# Patient Record
Sex: MALE | Race: WHITE | NOT HISPANIC OR LATINO | Employment: FULL TIME | URBAN - METROPOLITAN AREA
[De-identification: names, ages, dates, MRNs, and addresses within clinical notes are randomized per-mention and may not be internally consistent; named-entity substitution may affect disease eponyms.]

---

## 2017-06-21 ENCOUNTER — APPOINTMENT (OUTPATIENT)
Dept: GENERAL RADIOLOGY | Facility: CLINIC | Age: 47
DRG: 251 | End: 2017-06-21
Attending: PHYSICIAN ASSISTANT
Payer: COMMERCIAL

## 2017-06-21 ENCOUNTER — HOSPITAL ENCOUNTER (INPATIENT)
Facility: CLINIC | Age: 47
LOS: 1 days | Discharge: HOME | DRG: 251 | End: 2017-06-23
Attending: PHYSICIAN ASSISTANT | Admitting: INTERNAL MEDICINE
Payer: COMMERCIAL

## 2017-06-21 DIAGNOSIS — I25.110 CORONARY ARTERY DISEASE INVOLVING NATIVE CORONARY ARTERY OF NATIVE HEART WITH UNSTABLE ANGINA PECTORIS (H): ICD-10-CM

## 2017-06-21 DIAGNOSIS — R06.02 SHORTNESS OF BREATH: ICD-10-CM

## 2017-06-21 DIAGNOSIS — I20.0 UNSTABLE ANGINA (H): Primary | ICD-10-CM

## 2017-06-21 DIAGNOSIS — I24.9 ACUTE CORONARY SYNDROME (H): ICD-10-CM

## 2017-06-21 DIAGNOSIS — R07.9 ACUTE CHEST PAIN: ICD-10-CM

## 2017-06-21 LAB
ALBUMIN SERPL-MCNC: 4 G/DL (ref 3.4–5)
ALP SERPL-CCNC: 59 U/L (ref 40–150)
ALT SERPL W P-5'-P-CCNC: 24 U/L (ref 0–70)
ANION GAP SERPL CALCULATED.3IONS-SCNC: 5 MMOL/L (ref 3–14)
AST SERPL W P-5'-P-CCNC: 27 U/L (ref 0–45)
BASOPHILS # BLD AUTO: 0 10E9/L (ref 0–0.2)
BASOPHILS NFR BLD AUTO: 0.7 %
BILIRUB SERPL-MCNC: 0.6 MG/DL (ref 0.2–1.3)
BUN SERPL-MCNC: 15 MG/DL (ref 7–30)
CALCIUM SERPL-MCNC: 8.7 MG/DL (ref 8.5–10.1)
CHLORIDE SERPL-SCNC: 107 MMOL/L (ref 94–109)
CO2 SERPL-SCNC: 27 MMOL/L (ref 20–32)
CREAT SERPL-MCNC: 0.95 MG/DL (ref 0.66–1.25)
D DIMER PPP FEU-MCNC: NORMAL UG/ML FEU (ref 0–0.5)
DIFFERENTIAL METHOD BLD: ABNORMAL
EOSINOPHIL # BLD AUTO: 0.1 10E9/L (ref 0–0.7)
EOSINOPHIL NFR BLD AUTO: 2.3 %
ERYTHROCYTE [DISTWIDTH] IN BLOOD BY AUTOMATED COUNT: 14.1 % (ref 10–15)
GFR SERPL CREATININE-BSD FRML MDRD: 85 ML/MIN/1.7M2
GLUCOSE SERPL-MCNC: 92 MG/DL (ref 70–99)
HCT VFR BLD AUTO: 43.5 % (ref 40–53)
HGB BLD-MCNC: 15.4 G/DL (ref 13.3–17.7)
IMM GRANULOCYTES # BLD: 0 10E9/L (ref 0–0.4)
IMM GRANULOCYTES NFR BLD: 0.2 %
LIPASE SERPL-CCNC: 182 U/L (ref 73–393)
LYMPHOCYTES # BLD AUTO: 2.3 10E9/L (ref 0.8–5.3)
LYMPHOCYTES NFR BLD AUTO: 41.3 %
MCH RBC QN AUTO: 31.4 PG (ref 26.5–33)
MCHC RBC AUTO-ENTMCNC: 35.4 G/DL (ref 31.5–36.5)
MCV RBC AUTO: 89 FL (ref 78–100)
MONOCYTES # BLD AUTO: 0.5 10E9/L (ref 0–1.3)
MONOCYTES NFR BLD AUTO: 8.9 %
NEUTROPHILS # BLD AUTO: 2.6 10E9/L (ref 1.6–8.3)
NEUTROPHILS NFR BLD AUTO: 46.6 %
NRBC # BLD AUTO: 0.1 10*3/UL
NRBC BLD AUTO-RTO: 1 /100
PLATELET # BLD AUTO: 209 10E9/L (ref 150–450)
POTASSIUM SERPL-SCNC: 3.8 MMOL/L (ref 3.4–5.3)
PROT SERPL-MCNC: 8 G/DL (ref 6.8–8.8)
RBC # BLD AUTO: 4.91 10E12/L (ref 4.4–5.9)
SODIUM SERPL-SCNC: 139 MMOL/L (ref 133–144)
TROPONIN I SERPL-MCNC: NORMAL UG/L (ref 0–0.04)
WBC # BLD AUTO: 5.6 10E9/L (ref 4–11)

## 2017-06-21 PROCEDURE — 25000132 ZZH RX MED GY IP 250 OP 250 PS 637: Performed by: PHYSICIAN ASSISTANT

## 2017-06-21 PROCEDURE — 85379 FIBRIN DEGRADATION QUANT: CPT | Performed by: PHYSICIAN ASSISTANT

## 2017-06-21 PROCEDURE — 84484 ASSAY OF TROPONIN QUANT: CPT | Performed by: PHYSICIAN ASSISTANT

## 2017-06-21 PROCEDURE — 83690 ASSAY OF LIPASE: CPT | Performed by: PHYSICIAN ASSISTANT

## 2017-06-21 PROCEDURE — 93005 ELECTROCARDIOGRAM TRACING: CPT

## 2017-06-21 PROCEDURE — 85025 COMPLETE CBC W/AUTO DIFF WBC: CPT | Performed by: PHYSICIAN ASSISTANT

## 2017-06-21 PROCEDURE — 80053 COMPREHEN METABOLIC PANEL: CPT | Performed by: PHYSICIAN ASSISTANT

## 2017-06-21 PROCEDURE — 99285 EMERGENCY DEPT VISIT HI MDM: CPT | Mod: 25

## 2017-06-21 PROCEDURE — 71020 XR CHEST 2 VW: CPT

## 2017-06-21 RX ORDER — NITROGLYCERIN 0.4 MG/1
0.4 TABLET SUBLINGUAL EVERY 5 MIN PRN
Status: DISCONTINUED | OUTPATIENT
Start: 2017-06-21 | End: 2017-06-22

## 2017-06-21 RX ORDER — ASPIRIN 81 MG/1
324 TABLET, CHEWABLE ORAL ONCE
Status: COMPLETED | OUTPATIENT
Start: 2017-06-21 | End: 2017-06-21

## 2017-06-21 RX ORDER — LIDOCAINE 40 MG/G
CREAM TOPICAL
Status: DISCONTINUED | OUTPATIENT
Start: 2017-06-21 | End: 2017-06-22

## 2017-06-21 RX ADMIN — ASPIRIN 81 MG 324 MG: 81 TABLET ORAL at 22:01

## 2017-06-21 RX ADMIN — NITROGLYCERIN 0.4 MG: 0.4 TABLET SUBLINGUAL at 22:50

## 2017-06-21 RX ADMIN — NITROGLYCERIN 0.4 MG: 0.4 TABLET SUBLINGUAL at 22:57

## 2017-06-21 ASSESSMENT — ENCOUNTER SYMPTOMS
DIZZINESS: 1
CHILLS: 0
NAUSEA: 0
FEVER: 0
VOMITING: 0
SHORTNESS OF BREATH: 1

## 2017-06-21 NOTE — IP AVS SNAPSHOT
[facility]    [address]    [facility] [address]    Phone:  [phone]                                       After Visit Summary   6/21/2017    Chase Blackwell    MRN: [id number]           After Visit Summary Signature Page     I have received my discharge instructions, and my questions have been answered. I have discussed any challenges I see with this plan with the nurse or doctor.    ..........................................................................................................................................  Patient/Patient Representative Signature      ..........................................................................................................................................  Patient Representative Print Name and Relationship to Patient    ..................................................               ................................................  Date                                            Time    ..........................................................................................................................................  Reviewed by Signature/Title    ...................................................              ..............................................  Date                                                            Time

## 2017-06-22 ENCOUNTER — APPOINTMENT (OUTPATIENT)
Dept: CARDIOLOGY | Facility: CLINIC | Age: 47
DRG: 251 | End: 2017-06-22
Attending: INTERNAL MEDICINE
Payer: COMMERCIAL

## 2017-06-22 PROBLEM — R07.9 CHEST PAIN: Status: ACTIVE | Noted: 2017-06-22

## 2017-06-22 PROBLEM — I20.0 UNSTABLE ANGINA (H): Status: ACTIVE | Noted: 2017-06-22

## 2017-06-22 PROBLEM — I24.9 ACUTE CORONARY SYNDROME (H): Status: ACTIVE | Noted: 2017-06-22

## 2017-06-22 LAB
CHOLEST SERPL-MCNC: 224 MG/DL
HDLC SERPL-MCNC: 35 MG/DL
LDLC SERPL CALC-MCNC: 125 MG/DL
MAGNESIUM SERPL-MCNC: 2.2 MG/DL (ref 1.6–2.3)
NONHDLC SERPL-MCNC: 189 MG/DL
T4 FREE SERPL-MCNC: 0.64 NG/DL (ref 0.76–1.46)
TRIGL SERPL-MCNC: 318 MG/DL
TROPONIN I SERPL-MCNC: NORMAL UG/L (ref 0–0.04)
TROPONIN I SERPL-MCNC: NORMAL UG/L (ref 0–0.04)
TSH SERPL DL<=0.005 MIU/L-ACNC: 37.14 MU/L (ref 0.4–4)

## 2017-06-22 PROCEDURE — 93458 L HRT ARTERY/VENTRICLE ANGIO: CPT | Mod: 26 | Performed by: INTERNAL MEDICINE

## 2017-06-22 PROCEDURE — 93458 L HRT ARTERY/VENTRICLE ANGIO: CPT

## 2017-06-22 PROCEDURE — 02703ZZ DILATION OF CORONARY ARTERY, ONE ARTERY, PERCUTANEOUS APPROACH: ICD-10-PCS | Performed by: INTERNAL MEDICINE

## 2017-06-22 PROCEDURE — 84484 ASSAY OF TROPONIN QUANT: CPT | Performed by: INTERNAL MEDICINE

## 2017-06-22 PROCEDURE — C1769 GUIDE WIRE: HCPCS

## 2017-06-22 PROCEDURE — 25000125 ZZHC RX 250: Performed by: INTERNAL MEDICINE

## 2017-06-22 PROCEDURE — 99153 MOD SED SAME PHYS/QHP EA: CPT

## 2017-06-22 PROCEDURE — 27210759 ZZH DEVICE INFLATION CR6

## 2017-06-22 PROCEDURE — G0378 HOSPITAL OBSERVATION PER HR: HCPCS

## 2017-06-22 PROCEDURE — 99207 ZZC APP CREDIT; MD BILLING SHARED VISIT: CPT | Performed by: INTERNAL MEDICINE

## 2017-06-22 PROCEDURE — 25000132 ZZH RX MED GY IP 250 OP 250 PS 637: Performed by: INTERNAL MEDICINE

## 2017-06-22 PROCEDURE — 27211089 ZZH KIT ACIST INJECTOR CR3

## 2017-06-22 PROCEDURE — B2151ZZ FLUOROSCOPY OF LEFT HEART USING LOW OSMOLAR CONTRAST: ICD-10-PCS | Performed by: INTERNAL MEDICINE

## 2017-06-22 PROCEDURE — C1725 CATH, TRANSLUMIN NON-LASER: HCPCS

## 2017-06-22 PROCEDURE — 99153 MOD SED SAME PHYS/QHP EA: CPT | Performed by: INTERNAL MEDICINE

## 2017-06-22 PROCEDURE — 99207 ZZC CDG-CODE CATEGORY CHANGED: CPT | Performed by: INTERNAL MEDICINE

## 2017-06-22 PROCEDURE — 93325 DOPPLER ECHO COLOR FLOW MAPG: CPT | Mod: 26 | Performed by: INTERNAL MEDICINE

## 2017-06-22 PROCEDURE — 93010 ELECTROCARDIOGRAM REPORT: CPT | Performed by: INTERNAL MEDICINE

## 2017-06-22 PROCEDURE — 93321 DOPPLER ECHO F-UP/LMTD STD: CPT | Mod: 26 | Performed by: INTERNAL MEDICINE

## 2017-06-22 PROCEDURE — 99152 MOD SED SAME PHYS/QHP 5/>YRS: CPT | Performed by: INTERNAL MEDICINE

## 2017-06-22 PROCEDURE — 93016 CV STRESS TEST SUPVJ ONLY: CPT | Performed by: INTERNAL MEDICINE

## 2017-06-22 PROCEDURE — 93005 ELECTROCARDIOGRAM TRACING: CPT

## 2017-06-22 PROCEDURE — C1874 STENT, COATED/COV W/DEL SYS: HCPCS

## 2017-06-22 PROCEDURE — 27210795 ZZH PAD DEFIB QUICK CR4

## 2017-06-22 PROCEDURE — 80061 LIPID PANEL: CPT | Performed by: INTERNAL MEDICINE

## 2017-06-22 PROCEDURE — 27210787 ZZH MANIFOLD CR2

## 2017-06-22 PROCEDURE — 84443 ASSAY THYROID STIM HORMONE: CPT | Performed by: INTERNAL MEDICINE

## 2017-06-22 PROCEDURE — 4A023N7 MEASUREMENT OF CARDIAC SAMPLING AND PRESSURE, LEFT HEART, PERCUTANEOUS APPROACH: ICD-10-PCS | Performed by: INTERNAL MEDICINE

## 2017-06-22 PROCEDURE — 40000235 ZZH STATISTIC TELEMETRY

## 2017-06-22 PROCEDURE — 27210946 ZZH KIT HC TOTES DISP CR8

## 2017-06-22 PROCEDURE — 21000000 ZZH R&B IMCU HEART CARE

## 2017-06-22 PROCEDURE — 83735 ASSAY OF MAGNESIUM: CPT | Performed by: INTERNAL MEDICINE

## 2017-06-22 PROCEDURE — 93350 STRESS TTE ONLY: CPT | Mod: TC

## 2017-06-22 PROCEDURE — 25000128 H RX IP 250 OP 636: Performed by: INTERNAL MEDICINE

## 2017-06-22 PROCEDURE — 99207 ZZC APP CREDIT; MD BILLING SHARED VISIT: CPT | Performed by: PHYSICIAN ASSISTANT

## 2017-06-22 PROCEDURE — 40000806 ZZH STATISTIC OTHER ED EXTENDED CARE

## 2017-06-22 PROCEDURE — 99223 1ST HOSP IP/OBS HIGH 75: CPT | Performed by: INTERNAL MEDICINE

## 2017-06-22 PROCEDURE — B2111ZZ FLUOROSCOPY OF MULTIPLE CORONARY ARTERIES USING LOW OSMOLAR CONTRAST: ICD-10-PCS | Performed by: INTERNAL MEDICINE

## 2017-06-22 PROCEDURE — 99152 MOD SED SAME PHYS/QHP 5/>YRS: CPT

## 2017-06-22 PROCEDURE — 93018 CV STRESS TEST I&R ONLY: CPT | Performed by: INTERNAL MEDICINE

## 2017-06-22 PROCEDURE — 99221 1ST HOSP IP/OBS SF/LOW 40: CPT | Mod: 25 | Performed by: INTERNAL MEDICINE

## 2017-06-22 PROCEDURE — 92928 PRQ TCAT PLMT NTRAC ST 1 LES: CPT | Mod: LD | Performed by: INTERNAL MEDICINE

## 2017-06-22 PROCEDURE — 93350 STRESS TTE ONLY: CPT | Mod: 26 | Performed by: INTERNAL MEDICINE

## 2017-06-22 PROCEDURE — 36415 COLL VENOUS BLD VENIPUNCTURE: CPT | Performed by: INTERNAL MEDICINE

## 2017-06-22 PROCEDURE — 84439 ASSAY OF FREE THYROXINE: CPT | Performed by: INTERNAL MEDICINE

## 2017-06-22 PROCEDURE — C1887 CATHETER, GUIDING: HCPCS

## 2017-06-22 PROCEDURE — 27210998 ZZH ACCESS HEART CATH CR6

## 2017-06-22 PROCEDURE — C9600 PERC DRUG-EL COR STENT SING: HCPCS | Mod: LD

## 2017-06-22 RX ORDER — LORAZEPAM 0.5 MG/1
0.5 TABLET ORAL
Status: DISCONTINUED | OUTPATIENT
Start: 2017-06-22 | End: 2017-06-22

## 2017-06-22 RX ORDER — NALOXONE HYDROCHLORIDE 0.4 MG/ML
.1-.4 INJECTION, SOLUTION INTRAMUSCULAR; INTRAVENOUS; SUBCUTANEOUS
Status: DISCONTINUED | OUTPATIENT
Start: 2017-06-22 | End: 2017-06-22

## 2017-06-22 RX ORDER — NALOXONE HYDROCHLORIDE 0.4 MG/ML
.1-.4 INJECTION, SOLUTION INTRAMUSCULAR; INTRAVENOUS; SUBCUTANEOUS
Status: DISCONTINUED | OUTPATIENT
Start: 2017-06-22 | End: 2017-06-23 | Stop reason: HOSPADM

## 2017-06-22 RX ORDER — PROTAMINE SULFATE 10 MG/ML
25-100 INJECTION, SOLUTION INTRAVENOUS EVERY 5 MIN PRN
Status: DISCONTINUED | OUTPATIENT
Start: 2017-06-22 | End: 2017-06-22 | Stop reason: HOSPADM

## 2017-06-22 RX ORDER — FLUMAZENIL 0.1 MG/ML
0.2 INJECTION, SOLUTION INTRAVENOUS
Status: DISCONTINUED | OUTPATIENT
Start: 2017-06-22 | End: 2017-06-22 | Stop reason: HOSPADM

## 2017-06-22 RX ORDER — IOPAMIDOL 755 MG/ML
160 INJECTION, SOLUTION INTRAVASCULAR ONCE
Status: COMPLETED | OUTPATIENT
Start: 2017-06-22 | End: 2017-06-22

## 2017-06-22 RX ORDER — ACETAMINOPHEN 325 MG/1
325-650 TABLET ORAL EVERY 4 HOURS PRN
Status: DISCONTINUED | OUTPATIENT
Start: 2017-06-22 | End: 2017-06-22

## 2017-06-22 RX ORDER — HEPARIN SODIUM 1000 [USP'U]/ML
1000-10000 INJECTION, SOLUTION INTRAVENOUS; SUBCUTANEOUS EVERY 5 MIN PRN
Status: DISCONTINUED | OUTPATIENT
Start: 2017-06-22 | End: 2017-06-22 | Stop reason: HOSPADM

## 2017-06-22 RX ORDER — ASPIRIN 81 MG/1
81 TABLET ORAL DAILY
Status: DISCONTINUED | OUTPATIENT
Start: 2017-06-23 | End: 2017-06-23 | Stop reason: HOSPADM

## 2017-06-22 RX ORDER — PROMETHAZINE HYDROCHLORIDE 25 MG/ML
6.25-25 INJECTION, SOLUTION INTRAMUSCULAR; INTRAVENOUS EVERY 4 HOURS PRN
Status: DISCONTINUED | OUTPATIENT
Start: 2017-06-22 | End: 2017-06-22 | Stop reason: HOSPADM

## 2017-06-22 RX ORDER — MORPHINE SULFATE 2 MG/ML
1-2 INJECTION, SOLUTION INTRAMUSCULAR; INTRAVENOUS
Status: DISCONTINUED | OUTPATIENT
Start: 2017-06-22 | End: 2017-06-23 | Stop reason: HOSPADM

## 2017-06-22 RX ORDER — LIDOCAINE 40 MG/G
CREAM TOPICAL
Status: DISCONTINUED | OUTPATIENT
Start: 2017-06-22 | End: 2017-06-22

## 2017-06-22 RX ORDER — POTASSIUM CHLORIDE 29.8 MG/ML
20 INJECTION INTRAVENOUS
Status: DISCONTINUED | OUTPATIENT
Start: 2017-06-22 | End: 2017-06-22 | Stop reason: HOSPADM

## 2017-06-22 RX ORDER — ALUMINA, MAGNESIA, AND SIMETHICONE 2400; 2400; 240 MG/30ML; MG/30ML; MG/30ML
15-30 SUSPENSION ORAL EVERY 4 HOURS PRN
Status: DISCONTINUED | OUTPATIENT
Start: 2017-06-22 | End: 2017-06-23 | Stop reason: HOSPADM

## 2017-06-22 RX ORDER — ASPIRIN 81 MG/1
162 TABLET, CHEWABLE ORAL ONCE
Status: DISCONTINUED | OUTPATIENT
Start: 2017-06-22 | End: 2017-06-22

## 2017-06-22 RX ORDER — METOPROLOL TARTRATE 25 MG/1
25 TABLET, FILM COATED ORAL 2 TIMES DAILY
Status: DISCONTINUED | OUTPATIENT
Start: 2017-06-22 | End: 2017-06-23

## 2017-06-22 RX ORDER — ATROPINE SULFATE 0.1 MG/ML
.5-1 INJECTION INTRAVENOUS
Status: DISCONTINUED | OUTPATIENT
Start: 2017-06-22 | End: 2017-06-22 | Stop reason: HOSPADM

## 2017-06-22 RX ORDER — SODIUM NITROPRUSSIDE 25 MG/ML
100-200 INJECTION INTRAVENOUS
Status: DISCONTINUED | OUTPATIENT
Start: 2017-06-22 | End: 2017-06-22 | Stop reason: HOSPADM

## 2017-06-22 RX ORDER — ASPIRIN 81 MG/1
81 TABLET ORAL DAILY
Status: DISCONTINUED | OUTPATIENT
Start: 2017-06-22 | End: 2017-06-22

## 2017-06-22 RX ORDER — DEXTROSE MONOHYDRATE 25 G/50ML
12.5-5 INJECTION, SOLUTION INTRAVENOUS EVERY 30 MIN PRN
Status: DISCONTINUED | OUTPATIENT
Start: 2017-06-22 | End: 2017-06-22 | Stop reason: HOSPADM

## 2017-06-22 RX ORDER — PHENYLEPHRINE HCL IN 0.9% NACL 1 MG/10 ML
20-100 SYRINGE (ML) INTRAVENOUS
Status: DISCONTINUED | OUTPATIENT
Start: 2017-06-22 | End: 2017-06-22 | Stop reason: HOSPADM

## 2017-06-22 RX ORDER — ACETAMINOPHEN 325 MG/1
650 TABLET ORAL EVERY 4 HOURS PRN
Status: DISCONTINUED | OUTPATIENT
Start: 2017-06-22 | End: 2017-06-23 | Stop reason: HOSPADM

## 2017-06-22 RX ORDER — BUPIVACAINE HYDROCHLORIDE 2.5 MG/ML
1-10 INJECTION, SOLUTION EPIDURAL; INFILTRATION; INTRACAUDAL
Status: DISCONTINUED | OUTPATIENT
Start: 2017-06-22 | End: 2017-06-22 | Stop reason: HOSPADM

## 2017-06-22 RX ORDER — CLOPIDOGREL BISULFATE 75 MG/1
75 TABLET ORAL
Status: DISCONTINUED | OUTPATIENT
Start: 2017-06-22 | End: 2017-06-22 | Stop reason: HOSPADM

## 2017-06-22 RX ORDER — SODIUM CHLORIDE 9 MG/ML
INJECTION, SOLUTION INTRAVENOUS CONTINUOUS
Status: DISCONTINUED | OUTPATIENT
Start: 2017-06-22 | End: 2017-06-22

## 2017-06-22 RX ORDER — NALOXONE HYDROCHLORIDE 0.4 MG/ML
0.4 INJECTION, SOLUTION INTRAMUSCULAR; INTRAVENOUS; SUBCUTANEOUS EVERY 5 MIN PRN
Status: DISCONTINUED | OUTPATIENT
Start: 2017-06-22 | End: 2017-06-22 | Stop reason: HOSPADM

## 2017-06-22 RX ORDER — ASPIRIN 325 MG
325 TABLET ORAL
Status: DISCONTINUED | OUTPATIENT
Start: 2017-06-22 | End: 2017-06-22 | Stop reason: HOSPADM

## 2017-06-22 RX ORDER — LISINOPRIL 10 MG/1
10 TABLET ORAL DAILY
Status: DISCONTINUED | OUTPATIENT
Start: 2017-06-22 | End: 2017-06-23

## 2017-06-22 RX ORDER — DOBUTAMINE HYDROCHLORIDE 200 MG/100ML
2-20 INJECTION INTRAVENOUS CONTINUOUS PRN
Status: DISCONTINUED | OUTPATIENT
Start: 2017-06-22 | End: 2017-06-22 | Stop reason: HOSPADM

## 2017-06-22 RX ORDER — SODIUM CHLORIDE 9 MG/ML
INJECTION, SOLUTION INTRAVENOUS CONTINUOUS
Status: ACTIVE | OUTPATIENT
Start: 2017-06-22 | End: 2017-06-22

## 2017-06-22 RX ORDER — FUROSEMIDE 10 MG/ML
20-100 INJECTION INTRAMUSCULAR; INTRAVENOUS
Status: DISCONTINUED | OUTPATIENT
Start: 2017-06-22 | End: 2017-06-22 | Stop reason: HOSPADM

## 2017-06-22 RX ORDER — METHYLPREDNISOLONE SODIUM SUCCINATE 125 MG/2ML
125 INJECTION, POWDER, LYOPHILIZED, FOR SOLUTION INTRAMUSCULAR; INTRAVENOUS
Status: DISCONTINUED | OUTPATIENT
Start: 2017-06-22 | End: 2017-06-22 | Stop reason: HOSPADM

## 2017-06-22 RX ORDER — NALOXONE HYDROCHLORIDE 0.4 MG/ML
.2-.4 INJECTION, SOLUTION INTRAMUSCULAR; INTRAVENOUS; SUBCUTANEOUS
Status: ACTIVE | OUTPATIENT
Start: 2017-06-22 | End: 2017-06-23

## 2017-06-22 RX ORDER — NITROGLYCERIN 0.4 MG/1
0.4 TABLET SUBLINGUAL EVERY 5 MIN PRN
Status: DISCONTINUED | OUTPATIENT
Start: 2017-06-22 | End: 2017-06-22 | Stop reason: HOSPADM

## 2017-06-22 RX ORDER — METOPROLOL TARTRATE 1 MG/ML
5 INJECTION, SOLUTION INTRAVENOUS EVERY 5 MIN PRN
Status: DISCONTINUED | OUTPATIENT
Start: 2017-06-22 | End: 2017-06-22 | Stop reason: HOSPADM

## 2017-06-22 RX ORDER — LEVOTHYROXINE SODIUM 125 UG/1
125 TABLET ORAL DAILY
Status: DISCONTINUED | OUTPATIENT
Start: 2017-06-22 | End: 2017-06-22

## 2017-06-22 RX ORDER — LEVOTHYROXINE SODIUM 50 UG/1
50 TABLET ORAL
Status: DISCONTINUED | OUTPATIENT
Start: 2017-06-22 | End: 2017-06-23

## 2017-06-22 RX ORDER — HYDROCODONE BITARTRATE AND ACETAMINOPHEN 5; 325 MG/1; MG/1
1-2 TABLET ORAL EVERY 4 HOURS PRN
Status: DISCONTINUED | OUTPATIENT
Start: 2017-06-22 | End: 2017-06-23 | Stop reason: HOSPADM

## 2017-06-22 RX ORDER — ATROPINE SULFATE 0.1 MG/ML
0.5 INJECTION INTRAVENOUS EVERY 5 MIN PRN
Status: ACTIVE | OUTPATIENT
Start: 2017-06-22 | End: 2017-06-23

## 2017-06-22 RX ORDER — ADENOSINE 3 MG/ML
12-12000 INJECTION, SOLUTION INTRAVENOUS
Status: DISCONTINUED | OUTPATIENT
Start: 2017-06-22 | End: 2017-06-22 | Stop reason: HOSPADM

## 2017-06-22 RX ORDER — NICARDIPINE HYDROCHLORIDE 2.5 MG/ML
100 INJECTION INTRAVENOUS
Status: DISCONTINUED | OUTPATIENT
Start: 2017-06-22 | End: 2017-06-22 | Stop reason: HOSPADM

## 2017-06-22 RX ORDER — LORAZEPAM 2 MG/ML
.5-2 INJECTION INTRAMUSCULAR EVERY 4 HOURS PRN
Status: DISCONTINUED | OUTPATIENT
Start: 2017-06-22 | End: 2017-06-22 | Stop reason: HOSPADM

## 2017-06-22 RX ORDER — FENTANYL CITRATE 50 UG/ML
25-50 INJECTION, SOLUTION INTRAMUSCULAR; INTRAVENOUS
Status: DISPENSED | OUTPATIENT
Start: 2017-06-22 | End: 2017-06-23

## 2017-06-22 RX ORDER — NITROGLYCERIN 0.4 MG/1
0.4 TABLET SUBLINGUAL EVERY 5 MIN PRN
Status: DISCONTINUED | OUTPATIENT
Start: 2017-06-22 | End: 2017-06-22

## 2017-06-22 RX ORDER — ATORVASTATIN CALCIUM 40 MG/1
40 TABLET, FILM COATED ORAL
Status: DISCONTINUED | OUTPATIENT
Start: 2017-06-22 | End: 2017-06-22

## 2017-06-22 RX ORDER — MORPHINE SULFATE 2 MG/ML
1-2 INJECTION, SOLUTION INTRAMUSCULAR; INTRAVENOUS EVERY 5 MIN PRN
Status: DISCONTINUED | OUTPATIENT
Start: 2017-06-22 | End: 2017-06-22 | Stop reason: HOSPADM

## 2017-06-22 RX ORDER — ACETAMINOPHEN 650 MG/1
650 SUPPOSITORY RECTAL EVERY 4 HOURS PRN
Status: DISCONTINUED | OUTPATIENT
Start: 2017-06-22 | End: 2017-06-23 | Stop reason: HOSPADM

## 2017-06-22 RX ORDER — METOPROLOL SUCCINATE 25 MG/1
25 TABLET, EXTENDED RELEASE ORAL DAILY
Status: DISCONTINUED | OUTPATIENT
Start: 2017-06-22 | End: 2017-06-22 | Stop reason: ALTCHOICE

## 2017-06-22 RX ORDER — NITROGLYCERIN 0.4 MG/1
0.4 TABLET SUBLINGUAL EVERY 5 MIN PRN
Status: DISCONTINUED | OUTPATIENT
Start: 2017-06-22 | End: 2017-06-23 | Stop reason: HOSPADM

## 2017-06-22 RX ORDER — DIPHENHYDRAMINE HYDROCHLORIDE 50 MG/ML
25-50 INJECTION INTRAMUSCULAR; INTRAVENOUS
Status: DISCONTINUED | OUTPATIENT
Start: 2017-06-22 | End: 2017-06-22 | Stop reason: HOSPADM

## 2017-06-22 RX ORDER — ONDANSETRON 2 MG/ML
4 INJECTION INTRAMUSCULAR; INTRAVENOUS EVERY 4 HOURS PRN
Status: DISCONTINUED | OUTPATIENT
Start: 2017-06-22 | End: 2017-06-22 | Stop reason: HOSPADM

## 2017-06-22 RX ORDER — VERAPAMIL HYDROCHLORIDE 2.5 MG/ML
1-2.5 INJECTION, SOLUTION INTRAVENOUS
Status: DISCONTINUED | OUTPATIENT
Start: 2017-06-22 | End: 2017-06-22 | Stop reason: HOSPADM

## 2017-06-22 RX ORDER — NITROGLYCERIN 5 MG/ML
100-200 VIAL (ML) INTRAVENOUS
Status: DISCONTINUED | OUTPATIENT
Start: 2017-06-22 | End: 2017-06-22 | Stop reason: HOSPADM

## 2017-06-22 RX ORDER — LIDOCAINE HYDROCHLORIDE 10 MG/ML
1-10 INJECTION, SOLUTION EPIDURAL; INFILTRATION; INTRACAUDAL; PERINEURAL
Status: COMPLETED | OUTPATIENT
Start: 2017-06-22 | End: 2017-06-22

## 2017-06-22 RX ORDER — PROTAMINE SULFATE 10 MG/ML
1-5 INJECTION, SOLUTION INTRAVENOUS
Status: DISCONTINUED | OUTPATIENT
Start: 2017-06-22 | End: 2017-06-22 | Stop reason: HOSPADM

## 2017-06-22 RX ORDER — LIDOCAINE 40 MG/G
CREAM TOPICAL
Status: DISCONTINUED | OUTPATIENT
Start: 2017-06-22 | End: 2017-06-23 | Stop reason: HOSPADM

## 2017-06-22 RX ORDER — DOPAMINE HYDROCHLORIDE 160 MG/100ML
2-20 INJECTION, SOLUTION INTRAVENOUS CONTINUOUS PRN
Status: DISCONTINUED | OUTPATIENT
Start: 2017-06-22 | End: 2017-06-22 | Stop reason: HOSPADM

## 2017-06-22 RX ORDER — ATORVASTATIN CALCIUM 40 MG/1
40 TABLET, FILM COATED ORAL DAILY
Status: DISCONTINUED | OUTPATIENT
Start: 2017-06-22 | End: 2017-06-22

## 2017-06-22 RX ORDER — POTASSIUM CHLORIDE 7.45 MG/ML
10 INJECTION INTRAVENOUS
Status: DISCONTINUED | OUTPATIENT
Start: 2017-06-22 | End: 2017-06-22 | Stop reason: HOSPADM

## 2017-06-22 RX ORDER — ENALAPRILAT 1.25 MG/ML
1.25-2.5 INJECTION INTRAVENOUS
Status: DISCONTINUED | OUTPATIENT
Start: 2017-06-22 | End: 2017-06-22 | Stop reason: HOSPADM

## 2017-06-22 RX ORDER — CLOPIDOGREL BISULFATE 75 MG/1
300-600 TABLET ORAL
Status: DISCONTINUED | OUTPATIENT
Start: 2017-06-22 | End: 2017-06-22 | Stop reason: HOSPADM

## 2017-06-22 RX ORDER — LORAZEPAM 2 MG/ML
0.5 INJECTION INTRAMUSCULAR
Status: DISCONTINUED | OUTPATIENT
Start: 2017-06-22 | End: 2017-06-22

## 2017-06-22 RX ORDER — NITROGLYCERIN 20 MG/100ML
.07-2 INJECTION INTRAVENOUS CONTINUOUS PRN
Status: DISCONTINUED | OUTPATIENT
Start: 2017-06-22 | End: 2017-06-22 | Stop reason: HOSPADM

## 2017-06-22 RX ORDER — NIFEDIPINE 10 MG/1
10 CAPSULE ORAL
Status: DISCONTINUED | OUTPATIENT
Start: 2017-06-22 | End: 2017-06-22 | Stop reason: HOSPADM

## 2017-06-22 RX ORDER — HYDRALAZINE HYDROCHLORIDE 20 MG/ML
10-20 INJECTION INTRAMUSCULAR; INTRAVENOUS
Status: DISCONTINUED | OUTPATIENT
Start: 2017-06-22 | End: 2017-06-22 | Stop reason: HOSPADM

## 2017-06-22 RX ORDER — EPTIFIBATIDE 2 MG/ML
180 INJECTION, SOLUTION INTRAVENOUS EVERY 10 MIN PRN
Status: DISCONTINUED | OUTPATIENT
Start: 2017-06-22 | End: 2017-06-22 | Stop reason: HOSPADM

## 2017-06-22 RX ORDER — POTASSIUM CHLORIDE 1500 MG/1
20 TABLET, EXTENDED RELEASE ORAL
Status: COMPLETED | OUTPATIENT
Start: 2017-06-22 | End: 2017-06-22

## 2017-06-22 RX ORDER — ATORVASTATIN CALCIUM 40 MG/1
40 TABLET, FILM COATED ORAL DAILY
Status: DISCONTINUED | OUTPATIENT
Start: 2017-06-22 | End: 2017-06-23 | Stop reason: HOSPADM

## 2017-06-22 RX ORDER — NITROGLYCERIN 5 MG/ML
100-500 VIAL (ML) INTRAVENOUS
Status: DISCONTINUED | OUTPATIENT
Start: 2017-06-22 | End: 2017-06-22 | Stop reason: HOSPADM

## 2017-06-22 RX ORDER — ASPIRIN 81 MG/1
81-324 TABLET, CHEWABLE ORAL
Status: DISCONTINUED | OUTPATIENT
Start: 2017-06-22 | End: 2017-06-22 | Stop reason: HOSPADM

## 2017-06-22 RX ORDER — LIDOCAINE HYDROCHLORIDE 10 MG/ML
30 INJECTION, SOLUTION EPIDURAL; INFILTRATION; INTRACAUDAL; PERINEURAL
Status: DISCONTINUED | OUTPATIENT
Start: 2017-06-22 | End: 2017-06-22 | Stop reason: HOSPADM

## 2017-06-22 RX ORDER — FLUMAZENIL 0.1 MG/ML
0.2 INJECTION, SOLUTION INTRAVENOUS
Status: ACTIVE | OUTPATIENT
Start: 2017-06-22 | End: 2017-06-23

## 2017-06-22 RX ORDER — EPTIFIBATIDE 2 MG/ML
2 INJECTION, SOLUTION INTRAVENOUS CONTINUOUS PRN
Status: DISCONTINUED | OUTPATIENT
Start: 2017-06-22 | End: 2017-06-22 | Stop reason: HOSPADM

## 2017-06-22 RX ORDER — PRASUGREL 10 MG/1
10-60 TABLET, FILM COATED ORAL
Status: DISCONTINUED | OUTPATIENT
Start: 2017-06-22 | End: 2017-06-22 | Stop reason: HOSPADM

## 2017-06-22 RX ORDER — FENTANYL CITRATE 50 UG/ML
25-50 INJECTION, SOLUTION INTRAMUSCULAR; INTRAVENOUS
Status: DISCONTINUED | OUTPATIENT
Start: 2017-06-22 | End: 2017-06-22 | Stop reason: HOSPADM

## 2017-06-22 RX ADMIN — ASPIRIN 325 MG: 325 TABLET, DELAYED RELEASE ORAL at 13:23

## 2017-06-22 RX ADMIN — MIDAZOLAM HYDROCHLORIDE 1 MG: 1 INJECTION, SOLUTION INTRAMUSCULAR; INTRAVENOUS at 14:37

## 2017-06-22 RX ADMIN — POTASSIUM CHLORIDE 20 MEQ: 1500 TABLET, EXTENDED RELEASE ORAL at 13:23

## 2017-06-22 RX ADMIN — FENTANYL CITRATE 50 MCG: 50 INJECTION, SOLUTION INTRAMUSCULAR; INTRAVENOUS at 14:17

## 2017-06-22 RX ADMIN — LIDOCAINE HYDROCHLORIDE 100 MG: 10 INJECTION, SOLUTION EPIDURAL; INFILTRATION; INTRACAUDAL; PERINEURAL at 14:17

## 2017-06-22 RX ADMIN — FENTANYL CITRATE 50 MCG: 50 INJECTION, SOLUTION INTRAMUSCULAR; INTRAVENOUS at 14:37

## 2017-06-22 RX ADMIN — MIDAZOLAM HYDROCHLORIDE 1 MG: 1 INJECTION, SOLUTION INTRAMUSCULAR; INTRAVENOUS at 14:20

## 2017-06-22 RX ADMIN — MIDAZOLAM HYDROCHLORIDE 1 MG: 1 INJECTION, SOLUTION INTRAMUSCULAR; INTRAVENOUS at 14:17

## 2017-06-22 RX ADMIN — LISINOPRIL 10 MG: 10 TABLET ORAL at 20:57

## 2017-06-22 RX ADMIN — MIDAZOLAM HYDROCHLORIDE 1 MG: 1 INJECTION, SOLUTION INTRAMUSCULAR; INTRAVENOUS at 14:14

## 2017-06-22 RX ADMIN — Medication 64.4 MG: at 14:28

## 2017-06-22 RX ADMIN — TICAGRELOR 90 MG: 90 TABLET ORAL at 22:30

## 2017-06-22 RX ADMIN — FENTANYL CITRATE 50 MCG: 50 INJECTION, SOLUTION INTRAMUSCULAR; INTRAVENOUS at 14:14

## 2017-06-22 RX ADMIN — METOPROLOL TARTRATE 25 MG: 25 TABLET ORAL at 20:57

## 2017-06-22 RX ADMIN — TICAGRELOR 180 MG: 90 TABLET ORAL at 14:29

## 2017-06-22 RX ADMIN — IOPAMIDOL 160 ML: 755 INJECTION, SOLUTION INTRAVASCULAR at 15:15

## 2017-06-22 RX ADMIN — BIVALIRUDIN 1.75 MG/KG/HR: 250 INJECTION, POWDER, LYOPHILIZED, FOR SOLUTION INTRAVENOUS at 14:28

## 2017-06-22 RX ADMIN — NITROGLYCERIN 200 MCG: 5 INJECTION, SOLUTION INTRAVENOUS at 14:29

## 2017-06-22 RX ADMIN — ASPIRIN 81 MG: 81 TABLET, COATED ORAL at 10:33

## 2017-06-22 RX ADMIN — MIDAZOLAM HYDROCHLORIDE 1 MG: 1 INJECTION, SOLUTION INTRAMUSCULAR; INTRAVENOUS at 14:28

## 2017-06-22 RX ADMIN — FENTANYL CITRATE 25 MCG: 50 INJECTION, SOLUTION INTRAMUSCULAR; INTRAVENOUS at 18:02

## 2017-06-22 RX ADMIN — SODIUM CHLORIDE: 9 INJECTION, SOLUTION INTRAVENOUS at 13:20

## 2017-06-22 ASSESSMENT — ACTIVITIES OF DAILY LIVING (ADL)
FALL_HISTORY_WITHIN_LAST_SIX_MONTHS: NO
TOILETING: 0-->INDEPENDENT
COGNITION: 0 - NO COGNITION ISSUES REPORTED
AMBULATION: 0-->INDEPENDENT
BATHING: 0-->INDEPENDENT
TRANSFERRING: 0-->INDEPENDENT
SWALLOWING: 0-->SWALLOWS FOODS/LIQUIDS WITHOUT DIFFICULTY
DRESS: 0-->INDEPENDENT
RETIRED_EATING: 0-->INDEPENDENT
RETIRED_COMMUNICATION: 0-->UNDERSTANDS/COMMUNICATES WITHOUT DIFFICULTY

## 2017-06-22 ASSESSMENT — ENCOUNTER SYMPTOMS
MYALGIAS: 1
BACK PAIN: 0
PALPITATIONS: 0
DIAPHORESIS: 0
NECK PAIN: 0
COUGH: 0
ABDOMINAL PAIN: 0

## 2017-06-22 NOTE — PLAN OF CARE
Problem: Discharge Planning  Goal: Discharge Planning (Adult, OB, Behavioral, Peds)  Outcome: Improving  Observation goals PRIOR TO DISCHARGE     Comments: List all goals to be met before discharge home:   - Serial troponins and stress test complete. - partially met 3-trops stess ordered  - Seen and cleared by consultant if applicable - not met   - Adequate pain control on oral analgesia - met, no pain  - Vital signs normal or at patient baseline - met  - Safe disposition plan has been identified - not met   - Nurse to notify provider when observation goals have been met and patient is ready for discharge.

## 2017-06-22 NOTE — PHARMACY-ADMISSION MEDICATION HISTORY
Admission medication history interview status for the 6/21/2017  admission is complete. See EPIC admission navigator for prior to admission medications     Medication history source reliability:Good    Actions taken by pharmacist (provider contacted, etc):None     Additional medication history information not noted on PTA med list :None    Medication reconciliation/reorder completed by provider prior to medication history? No    Time spent in this activity: 20 minutes    Prior to Admission medications    Not on File

## 2017-06-22 NOTE — PLAN OF CARE
Problem: Goal Outcome Summary  Goal: Goal Outcome Summary  Outcome: Declining  Patient a/o ,voiding,npo,no c/o pain. Refused thyroid and colesterol meds. Prepared for angio unable to view vidio due to sound problem but felt  Explained to him well enough.sent to angio.

## 2017-06-22 NOTE — PLAN OF CARE
Problem: Discharge Planning  Goal: Discharge Planning (Adult, OB, Behavioral, Peds)  Outcome: Improving  Observation goals PRIOR TO DISCHARGE     Comments: List all goals to be met before discharge home:   - Serial troponins and stress test complete. - partially met; 2 trops neg, stress test in AM  - Seen and cleared by consultant if applicable - not met   - Adequate pain control on oral analgesia - met, no pain  - Vital signs normal or at patient baseline - met  - Safe disposition plan has been identified - not met   - Nurse to notify provider when observation goals have been met and patient is ready for discharge.        A&Ox4, VSS on RA. Denies chest pain, numbness, tingling.Trops neg x3, tele NSR. Stress test in AM.

## 2017-06-22 NOTE — PLAN OF CARE
Problem: Discharge Planning  Goal: Discharge Planning (Adult, OB, Behavioral, Peds)  Outcome: Improving  Observation goals PRIOR TO DISCHARGE     Comments: List all goals to be met before discharge home:   - Serial troponins and stress test complete. - partially met; 2 trops neg, stress test in AM  - Seen and cleared by consultant if applicable - not met   - Adequate pain control on oral analgesia - met, no pain  - Vital signs normal or at patient baseline - met  - Safe disposition plan has been identified - not met   - Nurse to notify provider when observation goals have been met and patient is ready for discharge.

## 2017-06-22 NOTE — ED PROVIDER NOTES
"  History     Chief Complaint:  Chest Pain    HPI   Chase Blackwell is a 46 year old male with high cholesterol who presents with his wife to the ED for evaluation of chest pain. For the past month the patient has had episodes of exertional chest pain, and his episodes have been gradually worsening.  Last night the patient had a worse than normal episode of chest pain, and again tonight he had another bad episode which brought him to the ED today. On arrival, the patient currently has chest pain. The patient reports his episodes usually last about 10 minutes and describes the pain as centralized, sharp, and feels \"like a pressure\" on his chest. He reports that pain is triggered by exertion, though he has had a few episodes while standing at work. He notes that he feels short of breath, and somewhat dizzy during the episodes of chest pain. There is some radiation to the left arm, but does not radiate into the back or neck. The patient does note that he currently has pain in his right thigh, however, no swelling. This pain is new for him recently. The patient's wife reports that he was recovering from a flu-like illness a month ago and reports he had and episode where he coughed up a blood clot. No current or continued cough. He denies further episodes of hemoptysis. The patient denies having any cold symptoms currently, nausea, or vomiting, abdominal pain, diaphoresis or any other symptoms or concerns. Of note, patient was a former smoker and quit a year ago and is not currently on any medications for his high cholesterol.    CARDIAC RISK FACTORS:  Sex:    Male  Tobacco:   Yes, quit 1 year ago  Hypertension:   No  Hyperlipidemia:  Yes, not on medication  Diabetes:   No  Family History:  Yes, grandfather heart attack, unsure what age    PE/DVT RISK FACTORS:  Sex:    Male  Hormones:   No  Tobacco:   Yes, quit 1 year ago  Cancer:   No  Travel:   No  Surgery:   No  Other immobilization: No  Personal history:  No  Family " "history:  No    Allergies:  Depakote     Medications:    Ondansetron  Ibuprofen  Hydroxyzine  Senna-docusate  Omeprazole  Pravastatin sodium  Levothyroxine    Past Medical History:    Gastro-oesophageal reflux disease  Hyperlipidemia  Thyroid disease    Past Surgical History:    Soft tissue surgery    Family History:    History reviewed. No pertinent family history.     Social History:  Smoking status: Former smoker  Alcohol use: Yes, occasionally  Marital Status:   [2]       Review of Systems   Constitutional: Negative for chills, diaphoresis and fever.   Respiratory: Positive for shortness of breath. Negative for cough.    Cardiovascular: Positive for chest pain. Negative for palpitations and leg swelling.   Gastrointestinal: Negative for abdominal pain, nausea and vomiting.   Musculoskeletal: Positive for myalgias (right thigh). Negative for back pain and neck pain.   Neurological: Positive for dizziness.   All other systems reviewed and are negative.      Physical Exam     Patient Vitals for the past 24 hrs:   BP Temp Temp src Pulse Heart Rate Resp SpO2 Height Weight   06/21/17 2255 125/72 - - - 79 - 92 % - -   06/21/17 2249 138/80 - - - 73 - 97 % - -   06/21/17 2230 122/78 - - - 72 - 93 % - -   06/21/17 2200 127/87 - - - 76 14 95 % - -   06/21/17 2135 - - - - - - 97 % - -   06/21/17 2134 134/85 - - - 75 - - - -   06/21/17 2122 130/80 98.6  F (37  C) Oral 72 - 16 99 % 1.803 m (5' 11\") 86.2 kg (190 lb)         Physical Exam  General: Resting comfortably on the gurney.    Head:  The scalp, head and face appear normal.   ENT:  Pupils are equal, round and reactive to light.     Oropharynx is moist.     Neck:  Supple, no rigidity noted. Normal ROM.     Trachea midline. No mass detected.    Resp:  Non-labored breathing. No tachypnea.     Lung fields clear to auscultation without wheezes or rales.   CV:  Regular rate and rhythm. Normal S1 and S2, no S3 or S4.     No pathological murmur detected.     Radial, DP " and PT pulses intact and symmetric.   GI:  Abdomen is soft and non-distended.     Mild epigastric discomfort with palpation, no rebound or guarding.     Abdomen otherwise non-tender. No masses or organomegaly.   MS:  Normal muscular tone.     No chest wall tenderness with palpation.     Normal and symmetric motor strength of all four extremities.     No asymmetrical lower leg swelling or calf tenderness.   Neuro:  Awake and alert. Speech is clear.   Skin:  No rash or pallor.  Psych: Normal affect. Appropriate interactions.       Emergency Department Course   ECG (21:30:12):  Rate 69 bpm. IN interval 168. QRS duration 94. QT/QTc 400/428. P-R-T axes 10 27 50. Normal sinus rhythm. Normal ECG. Interpreted at 9:37pm by Gwendolyn Blue PA-C.    Imaging:  Radiographic findings were communicated with the patient who voiced understanding of the findings.  X-ray Chest, 2 views:  No acute abnormality.  As read by Radiology.     Laboratory:  D dimer quant: <0.3  Troponin: <0.015  CBC: WNL (WBC 4.91, HGB 15.4, )   CMP: WNL (Creatinine 0.85)  Lipase: 182    Interventions:  2201: Aspirin 324mg oral   2250: Nitroglycerin 0.4mg sublingual  2257: Nitroglycerin 0.4mg sublingual    Emergency Department Course:  Past medical records, nursing notes, and vitals reviewed.  2145: I performed an exam of the patient and obtained history, as documented above.  IV inserted and blood drawn. The patient was placed on continuous cardiac monitoring and pulse oximetry.  ECG ordered, results above.  The patient was sent for a chest x-ray while in the emergency department, findings above.    The patient's pain improved from a 6/10 to a 0/10 after 2 sublingual Nitro.    2341: I rechecked the patient. Explained findings to the patient.    2345: I spoke to Dr. Ramos of the hospitalist service who accepts the patient for admission.     Findings and plan explained to the Patient who consents to admission.   Discussed the patient with   Richard, who will admit the patient to an obs tele bed for further monitoring, evaluation, and treatment.     Impression & Plan    Medical Decision Making:  Mr. Blackwell is a 46 year old male with a history of hyperlipidemia and hypothyroid who presents to the Emergency Department with chest pain. He has had pressure like chest pain and shortness of breath with minimal exertion for the past month that has been gradually worsening. Concern was for ACS, arrhythmia, PE, dissection, pneumonia, among others. His work up here is largely unremarkable with a normal ECG with no signs of ischemia or arrhythmia. No leukocytosis, anemia, concerning electrolyte abnormality, renal or hepatic dysfunction. D-dimer is negative making PE unlikely. Chest x-ray is negative with no signs of cardiomegaly, infiltrate, effusion, or any other acute abnormality. With no back pain and normal d-dimer and no widened mediastinum on chest x-ray I doubt dissection in this patient. Troponin multiple days after the onset of symptoms is negative making acute ischemia less likely. However, his story is very concerning for cardiac etiology and he also had a reduction in his pain from 6 to 0 after two Nitro. He has a HEART score of 4 and I do feel that he requires admission for observation and stress testing in the morning. I discussed the case with Dr. Ramos of the hospitalist service who accepted care of the patient to the observation unit. I discussed the results, plan and any additional questions with the patient and his wife. They verbalized understanding and agreement with the plan.      Diagnosis:    ICD-10-CM   1. Acute chest pain R07.9   2. Shortness of breath R06.02     Disposition: Admitted to an obs tele bed under the care of Dr. Richard Ramírez  6/21/2017    EMERGENCY DEPARTMENT  Anna DOAN, am serving as a scribe at 9:45 PM on 6/21/2017 to document services personally performed by Gwendolyn Blue PA-C based on my  observations and the provider's statements to me.        Gwendolyn Blue PA-C  06/22/17 0046

## 2017-06-22 NOTE — PROGRESS NOTES
[facility]    Hospitalist Progress Note    Date of Service (when I saw the patient): 06/22/2017    Assessment & Plan   Chase Blackwell is a 46-year-old male with past medical history of untreated hypothyroidism, hyperlipidemia and GERD, who presented 6/22/17 complaining of chest pain that is typical for angina, found to have an abnormal stress test, admitted for further cardiac intervention. Vitals currently WNL. Pt currently stable.     Chest pain with typical features, abnormal stress test: Pt with one month of chest pain with exertion and associated SOB. Worsening recently with decreased level of activity. Symptoms relieved after 10-15 minutes of rest. Alleviated by nitro in the ED. Risk factors for cardiac dz include untreated hyperlipidemia, family hx of CAD (grandfather), male gender. CMP, CBC unremarkable. Troponin negative X3. Lipid profile 224/125/35/189.   -- Monitor on telemetry; no acute abnormalities reported overnight   -- Stress echo showing a large area of anterior, septal and apical ischemia with worsening LV function, appears to be in LAD distribution. There were no ST segment changes observed with stress.   -- Cardiology consulted  -- NPO   -- ASA 81 mg qd   -- Atorvastatin 40 mg po qd     Hypothyroidism, untreated: TSH 37.14 (H), T4 0.64 (L). Pt has been taking iodine supplements for this. Pt previously on Synthroid 150 mcg po qd, but stopped this.   -- Recommend reinitiating Synthroid 50 mcg po qd with goal to uptitrate back to previous dose. Pt currently not wishing to do this. Will have to revisit conversation at a later time     Hyperlipidemia, untreated:  The patient states he is no longer taking statin medications, states he had muscle cramps with pravastatin 40 mg po qd. Lipid profile 224/125/35/189.  -- Recommend atorvastatin 40 mg po qd; pt hesitant about starting this    History of GERD: The patient states he is off of Prilosec.   -- Continue to monitor symptoms.       History of loose stools of unclear etiology: Nothing since admission, did recently start magnesium OTC supplementation. Continue to monitor.  At this time, I do not see a reason to start infectious workup, barring any change in his clinical status. Afebrile since admisison. WBC 5.6    DVT Prophylaxis: Ambulate every shift  Code Status: Full Code    Disposition: Expected discharge pending further workup     Stacy Shaffer PA-C    This patient was discussed with Dr. Hernandez of the Hospitalist Service who agrees with current plans as outlined above.     Interval History   Pt with ongoing chest discomfort with exertion. Abnormal stress test this AM; showing a large area of anterior, septal and apical ischemia with worsening LV function, appears to be in LAD distribution. There were no ST segment changes observed with stress. Discussed results with patient and girlfriend (with patient permission) at bedside. Pt seems overwhelmed. Has been off statin for <1 year, previously on pravastatin 40 mg po qd, due to muscle cramps. Also took himself off of his synthroid and appears to be taking a more holistic approach to medicine. He states that he takes iodine supplementation for this and that is what he would prefer to keep doing. Also taking supplements including krill oil, tumeric, blue green algae, pomegranate juice, magnesium (for 1 week).     -Data reviewed today: I reviewed all new labs and imaging results over the last 24 hours. See below.     Physical Exam   Temp: 97.2  F (36.2  C) Temp src: Oral BP: 125/72 Pulse: 72 Heart Rate: 69 Resp: 16 SpO2: 99 % O2 Device: None (Room air)    Vitals:    06/21/17 2122 06/22/17 0053   Weight: 86.2 kg (190 lb) 85.9 kg (189 lb 4.8 oz)     Vital Signs with Ranges  Temp:  [97  F (36.1  C)-98.6  F (37  C)] 97.2  F (36.2  C)  Pulse:  [72] 72  Heart Rate:  [65-82] 69  Resp:  [11-19] 16  BP: (112-138)/(71-87) 125/72  SpO2:  [92 %-99 %] 99 %  I/O last 3 completed shifts:  In: 3  [I.V.:3]  Out: -     CONSTITUTIONAL: Pt laying in bed, dressed in hospital garb. Appears comfortable. Cooperative with interview. Accompanied by girlfriend at bedside.  HEENT: Normocephalic, atraumatic. Negative for conjunctival redness or scleral icterus.  Oral mucosa pink and moist; negative for ulcerations, erythema, or exudates.    CARDIOVASCULAR: RRR, no murmurs, rubs, or extra heart sounds appreciated. Pulses +2/4 and regular in upper and lower extremities, bilaterally.   RESPIRATORY: No increased work of breathing.  CTA, bilat; no wheezes, rales, or rhonchi appreciated.  GASTROINTESTINAL:  Abdomen soft, non-distended. BS auscultated in all four quadrants. Negative for tenderness to  palpation.  No masses or organomegaly noted.  MUSCULOSKELETAL: No gross deformities noted. Normal muscle tone.   HEMATOLOGIC/LYMPHATIC/IMMUNOLOGIC: Negative for lower extremity edema, bilaterally.  NEUROLOGIC: Alert and oriented to person, place, and time. No focal neuro deficits appreciated.   SKIN: Warm, dry, intact. No jaundice noted. Negative for suspicious lesions, rashes, bruising, open sores or abrasions.     Medications        sodium chloride (PF)  3 mL Intracatheter Q8H     aspirin  162 mg Oral Once     aspirin EC  81 mg Oral Daily     atorvastatin  40 mg Oral Daily     levothyroxine  50 mcg Oral QAM AC       Data     Recent Labs  Lab 06/22/17  0540 06/22/17  0125 06/21/17  2145   WBC  --   --  5.6   HGB  --   --  15.4   MCV  --   --  89   PLT  --   --  209   NA  --   --  139   POTASSIUM  --   --  3.8   CHLORIDE  --   --  107   CO2  --   --  27   BUN  --   --  15   CR  --   --  0.95   ANIONGAP  --   --  5   TOY  --   --  8.7   GLC  --   --  92   ALBUMIN  --   --  4.0   PROTTOTAL  --   --  8.0   BILITOTAL  --   --  0.6   ALKPHOS  --   --  59   ALT  --   --  24   AST  --   --  27   LIPASE  --   --  182   TROPI <0.015The 99th percentile for upper reference range is 0.045 ug/L.  Troponin values in the range of 0.045 - 0.120  ug/L may be associated with risks of adverse clinical events. <0.015The 99th percentile for upper reference range is 0.045 ug/L.  Troponin values in the range of 0.045 - 0.120 ug/L may be associated with risks of adverse clinical events. <0.015The 99th percentile for upper reference range is 0.045 ug/L.  Troponin values in the range of 0.045 - 0.120 ug/L may be associated with risks of adverse clinical events.       Recent Results (from the past 24 hour(s))   XR Chest 2 Views    Narrative    CHEST TWO VIEW  6/21/2017 10:09 PM      HISTORY: Chest pain.    COMPARISON: 9/9/2009    FINDINGS: The heart size is normal. The lungs are clear. No  pneumothorax or pleural effusion.      Impression    IMPRESSION:  No acute abnormality.    JOANIE BRAY MD

## 2017-06-22 NOTE — PROGRESS NOTES
1515Pt to  5 waiting for bed to be ready. On arrival CP 4/10. Then resting comfortably. VSS. NSR. Rt groin sheath in place D+I.  7619 Report to Liana UGALDE from [facility]. Pt taken to room with escort on monitored cart. Welcome desk notified to update Pt's GF after consult with

## 2017-06-22 NOTE — ED NOTES
[facility]  ED Nurse Handoff Report    ED Chief complaint: Chest Pain (mid sternal chest pain radiates to left arm, has been going on/off x 1 month, worse over the last 3 -4 days. Endorses SOB, Dizziness. Denies lightheadedness, n/v worse after physical activity)      ED Diagnosis:   Final diagnoses:   Acute chest pain   Shortness of breath       Code Status: Full Code    Allergies:   Allergies   Allergen Reactions     Depakote [Valproic Acid] Anxiety     Makes pt feel anxious and tight in the upper chest       Activity level - Baseline/Home:  Independent    Activity Level - Current:   Independent     Needed?: No    Isolation: No  Infection: Not Applicable    Bariatric?: No    Vital Signs:   Vitals:    06/21/17 2200 06/21/17 2230 06/21/17 2249 06/21/17 2255   BP: 127/87 122/78 138/80 125/72   Pulse:       Resp: 14      Temp:       TempSrc:       SpO2: 95% 93% 97% 92%   Weight:       Height:           Cardiac Rhythm: ,        Pain level: 0-10 Pain Scale: 0    Is this patient confused?: No    Patient Report: Initial Complaint: Chest Pain.  Focused Assessment: Patient c/o non-radiating intermittent mid-sternal chest pain/ pressure that lasts about 10 minutes and some dizziness. Denies any nausea. Also c/o shortness of breath with activity. Chest pain was improved after 0.4 MG Nitroglycerin x2.  Tests Performed: Labs, EKG and x-ray.  Abnormal Results: None.  Treatments provided: 0.4 MG Nitroglycerin X2; 324 MG Aspirin;  Family Comments:  Wife at bedside, very supportive.    OBS brochure/video discussed/provided to patient: Yes    ED Medications:   Medications   lidocaine 1 % 1 mL (not administered)   lidocaine (LMX4) cream (not administered)   sodium chloride (PF) 0.9% PF flush 3 mL (not administered)   sodium chloride (PF) 0.9% PF flush 3 mL (not administered)   nitroglycerin (NITROSTAT) sublingual tablet 0.4 mg (0.4 mg Sublingual Given 6/21/17 2257)   aspirin chewable tablet 324 mg (324 mg  Oral Given 6/21/17 2201)       Drips infusing?:  No      ED NURSE PHONE NUMBER: [phone]

## 2017-06-22 NOTE — H&P
CHIEF COMPLAINT:  Chest pain.      HISTORY OF PRESENT ILLNESS:  Chase Blackwell is a pleasant 46-year-old male with a past medical history notable for hypothyroidism, off thyroid supplementation, hyperlipidemia, GERD, off PPI, who presents accompanied by his significant other for chest pain.  The patient states the symptoms began about a month ago and he noticed chest discomfort on exertion.  He recalls that over the past one week, his symptoms have become more pronounced with decreasing levels of exertion.  He cites an example of last night he was having sexual intercourse and developed substernal chest pain.  Again this morning he was doing light work at home and again noticed substernal chest pain and pressure, this time 7/10 thus prompting medical attention.  The patient did receive two nitroglycerin in the Emergency Departmentwith resolution of his symptoms. His symptoms have also been exacerbated when walking up stairs or walking for long distances on level ground.  He notes that taking a break relieves the symptoms in about 10-15 minutes.  He has also been having shortness of breath associated with these episodes. He denies any dizziness, lightheadedness, falls, or loss of consciousness.  He denies any fevers, chills, or night sweats.  He does also endorse having some upper abdominal discomfort which may be due to his GERD for which he is now off of Protonix.  He also has been having 3-4 loose stools a day over the past one month.  He denies any bloody emesis, nor any blood in his stools, or black stools.  He denies having any numbness/tingling in his extremities, but does recall having some left arm pain associated with some of the episodes of chest pain.  He does not have any lower extremity swelling, but feels like he may have gained some weight over the past several months, but unable to quantify exactly how much.  He denies any focal weakness nor any visual changes.  He denies any new back pain.  His  appetite has been fine.      PAST MEDICAL HISTORY:   1.  Thyroid disease, on iodine supplementation.   2.  Hyperlipidemia.   3.  Gastroesophageal reflux disease.      MEDICATIONS:  Currently taking none.      ALLERGIES:  Depakote.      SOCIAL HISTORY:  The patient denies tobacco use.  He drinks one alcoholic beverage with dinner every day.  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      FAMILY HISTORY:  Notes his mother  of cancer.  His father  of multiorgan failure; he is not sure exactly how.  He notes one grandfather who had a heart attack and passed away from this, but he is unsure of the age at which he passed away.      REVIEW OF SYSTEMS:  A 10-point review of systems was performed and negative except as per HPI.  The patient does also recall having new brown spots on his left upper extremity which he has not had before, and these have been occurring over the past one month and do not cause him any pain or discomfort.  His wife notes that they have become a little bit dry, and just wanted to bring them up to me.      PHYSICAL EXAMINATION:   VITAL SIGNS:  Temperature 98.6 degrees Fahrenheit, heart rate 72, blood pressure 130/80, respirations 16, satting 92-99% on room air.  Weight is 86.2 kg.   GENERAL:  No acute distress.  Appears stated age.  Significant other is at bedside.   HEENT:  Normocephalic, atraumatic.  Moist mucous membranes.  Uvula midline.  Anicteric sclerae.   NECK:  Supple.  No lymphadenopathy.  Trachea midline.   CARDIAC:  Regular rate and rhythm.  No additional heart sounds.   PULMONARY:  Clear to auscultation bilaterally.  No wheezes, rhonchi or rales.   GASTROINTESTINAL:  Bowel sounds are positive.  Soft, nondistended.  Tenderness to deep palpation in the epigastric region, no rebound or guarding appreciated.   EXTREMITIES:  No edema noted.  DP pulses equal, bilaterally, warm extremities.   NEUROLOGIC:  Moves all four extremities against resistance.  Cranial nerves  II-XII grossly intact.   PSYCHIATRIC:  Appropriate mood and affect, oriented x3.   SKIN:  Several subcentimeter brown nonraised lesions are appreciated on the left wrist dorsal aspect and the left elbow.  No drainage or skin breaks appreciated.  No erythema or induration.      LAB RESULTS:  CMP is normal.  Initial troponin is negative.  Lipase is 182.  CBC is normal.  Glucose is 92.  D-dimer is negative.      Chest x-ray shows no acute findings.      EKG shows normal sinus rhythm.  QTc is normal.  No acute ST or T-wave changes indicative of active ischemia.      ASSESSMENT:  Chase Blackwell is a 46-year-old male with past medical history of hyperlipidemia and GERD, who presents complaining of chest pain that is typical for angina.  He is registered to observation for further evaluation.      PLAN:   1.  Chest pain, likely cardiac in etiology, given typical anginal symptoms.  The patient is hemodynamically stable at this time.  Lab studies are reassuring. He will be registered to observation.   -- Trend his troponins, continuous cardiac monitoring.   -- The patient received a full-dose aspirin in the ER, continue with daily 81 mg aspirin.   -- If workup is unremarkable tonight, an exercise stress echocardiogram has been ordered for tomorrow.   -- Cardiac diet without caffeine.     2.  History of GERD.  The patient states he is off of Prilosec.   -- Continue to monitor symptoms.     3.  History of hyperlipidemia.  The patient states he is no longer taking statin medications.   -- We will check a fasting lipid panel in the morning to guide risk stratification.     4.  History of loose stools of unclear etiology.   -- Continue to monitor.  At this time, I do not see a reason to start infectious workup, barring any change in his clinical status.     5.  DVT prophylaxis with PCDs.     6.  The patient is a FULL CODE.      PEARL POPE MD      D: 06/22/2017 00:31   T: 06/22/2017 01:56   MT: [id number]      Name:     ISA  BENRIE   MRN:      [id number]        Account:      [id number]   :      1970           Admitted:     [id number]      Document: [id number]       cc: Live Lott MD

## 2017-06-22 NOTE — CONSULTS
One month accelerating exertional angina led to ED visit.  Stress study abnormal at low level with large anteroapical ischemic region.  Risks, benefits and alternatives of coronary angiogram/PCI/DAPT discussed and he agrees to proceed.  Consult dictated.    He should be admitted.

## 2017-06-22 NOTE — CONSULTS
"CARDIOLOGY CONSULTATION      REQUESTING PHYSICIAN:  None given.      PATIENT HISTORY:  Mr. Chase Blackwell is 46 years old and has been admitted to the hospital for exertional chest pain.  He underwent a stress echocardiogram this morning which was abnormal and cardiology consultation has been requested by the Hospitalist Service.      Mr. Blackwell was interviewed with his wife present.  For 1 month, he has been developing increasing exertional angina.  He has had \"a little\" discomfort at rest more recently.  He notes the episodes have become longer, more easily provoked and more severe.  He has substernal chest discomfort.  Yesterday, the discomfort was provoked merely by doing the laundry.      He has a prior tobacco use history, but is not smoking now.  He denies a family history of heart disease.  He does not have diabetes mellitus or known hypertension.  His lipids have demonstrated a low HDL at 35 and increased LDL of 125 mg/dL with elevated triglycerides.  They are 300 mg per deciliter.  Interestingly, his TSH level has returned very high at 37 suggesting hypothyroidism contributing to his hypertriglyceridemia.      He underwent stress echocardiography this morning.  I reviewed the stress echocardiogram which demonstrates a large and provokable anteroseptal wall motion abnormality.  His left ventricular ejection fraction was normal at rest.  He was able to exercise only 6 minutes developing significant chest discomfort at 4-1/2 minutes.  He reached only 61% of his age-predicted heart rate.  He is currently free of discomfort.      SOCIAL HISTORY:  Mr. Blackwell is .  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      ALLERGIES:  Depakote.      REVIEW OF SYSTEMS:   CARDIAC:  As noted above.   RESPIRATORY:  Negative for dyspnea.   HEMATOLOGIC:  Negative for abnormal bleeding.   GENITOURINARY:  Negative for blood in his urine.   GASTROINTESTINAL:  Negative for blood in his stool.   ENDOCRINE:  " Negative for a history of diabetes mellitus.  Hypothyroidism has previously been diagnosed for which the patient has been taking iodine supplements and for which he was previously on levothyroxine, but which he had discontinued.      MEDICATIONS:  See Epic note.      FAMILY HISTORY:  Negative for coronary artery disease.      PHYSICAL EXAMINATION:   GENERAL:  This is a healthy man in no apparent distress.  He was alert and oriented to person, place and time.   VITAL SIGNS:  Blood pressure was 125/72 mmHg, heart rate 69 beats per minute and regular, respiratory rate 14-18 per minute.  The patient was alert and oriented to person, place and time.  HEAD:  Normocephalic.   SKIN:  Warm and dry without cyanosis or jaundice.   NECK:  Notable for an absence of thyromegaly.  Carotid upstrokes were normal without bruits.   CHEST:  Clear to auscultation.   CARDIAC:  On cardiac auscultation, there was an S1 and S2 without extra sounds or murmur.  The rhythm was regular.   ABDOMEN:  There were no bruits or masses.  There was no tenderness.   EXTREMITIES:  There is no peripheral pedal edema.   NEUROLOGIC:  The facies were symmetric, and he moved all extremities without focal limitation.      LABORATORY DATA:  EKG was normal at rest without ischemic ST segment changes or abnormal Q-waves.  The TSH was elevated as noted.  White blood cell count was 5.6, hemoglobin 15.4 and platelet count 209,000.  The sodium was 139, potassium 3.8, BUN 15 and creatinine 0.95.      ASSESSMENT:  Mr. Blackwell is doing well at rest but has increasing exertional angina and has a very abnormal stress study at a low level of exertion.  The stress study was abnormal in the LAD distribution.  As such, I recommended coronary angiography and percutaneous intervention.  I discussed the risks, benefits and alternatives of coronary angiography, percutaneous intervention and dual antiplatelet therapy.  I also explained the risk of possible emergent bypass surgery.   He has agreed to proceed.      He is already on aspirin.  He will be initiated on beta blockade, statin therapy and an ARB agent.         MILES DIALLO MD, [facility]             D: 2017 12:40   T: 2017 13:37   MT: [email or web address]      Name:     BERNIE MOSS   MRN:      [id number]        Account:       [id number]   :      1970           Consult Date:  2017      Document: [id number]

## 2017-06-23 ENCOUNTER — APPOINTMENT (OUTPATIENT)
Dept: OCCUPATIONAL THERAPY | Facility: CLINIC | Age: 47
DRG: 251 | End: 2017-06-23
Attending: INTERNAL MEDICINE
Payer: COMMERCIAL

## 2017-06-23 ENCOUNTER — APPOINTMENT (OUTPATIENT)
Dept: CT IMAGING | Facility: CLINIC | Age: 47
DRG: 251 | End: 2017-06-23
Attending: FAMILY MEDICINE
Payer: COMMERCIAL

## 2017-06-23 VITALS
RESPIRATION RATE: 16 BRPM | DIASTOLIC BLOOD PRESSURE: 66 MMHG | BODY MASS INDEX: 26.5 KG/M2 | OXYGEN SATURATION: 97 % | TEMPERATURE: 97.8 F | HEIGHT: 71 IN | HEART RATE: 64 BPM | WEIGHT: 189.3 LBS | SYSTOLIC BLOOD PRESSURE: 99 MMHG

## 2017-06-23 LAB
ABO + RH BLD: NORMAL
ANION GAP SERPL CALCULATED.3IONS-SCNC: 4 MMOL/L (ref 3–14)
BLD GP AB SCN SERPL QL: NORMAL
BLOOD BANK CMNT PATIENT-IMP: NORMAL
BUN SERPL-MCNC: 15 MG/DL (ref 7–30)
CALCIUM SERPL-MCNC: 8.9 MG/DL (ref 8.5–10.1)
CHLORIDE SERPL-SCNC: 106 MMOL/L (ref 94–109)
CO2 SERPL-SCNC: 28 MMOL/L (ref 20–32)
CREAT SERPL-MCNC: 0.99 MG/DL (ref 0.66–1.25)
ERYTHROCYTE [DISTWIDTH] IN BLOOD BY AUTOMATED COUNT: 13.9 % (ref 10–15)
ERYTHROCYTE [DISTWIDTH] IN BLOOD BY AUTOMATED COUNT: 14 % (ref 10–15)
GFR SERPL CREATININE-BSD FRML MDRD: 81 ML/MIN/1.7M2
GLUCOSE BLDC GLUCOMTR-MCNC: 103 MG/DL (ref 70–99)
GLUCOSE SERPL-MCNC: 105 MG/DL (ref 70–99)
HCT VFR BLD AUTO: 42.5 % (ref 40–53)
HCT VFR BLD AUTO: 42.6 % (ref 40–53)
HGB BLD-MCNC: 14.5 G/DL (ref 13.3–17.7)
HGB BLD-MCNC: 14.6 G/DL (ref 13.3–17.7)
INR PPP: 1.04 (ref 0.86–1.14)
MCH RBC QN AUTO: 29.6 PG (ref 26.5–33)
MCH RBC QN AUTO: 30.5 PG (ref 26.5–33)
MCHC RBC AUTO-ENTMCNC: 34.1 G/DL (ref 31.5–36.5)
MCHC RBC AUTO-ENTMCNC: 34.3 G/DL (ref 31.5–36.5)
MCV RBC AUTO: 87 FL (ref 78–100)
MCV RBC AUTO: 89 FL (ref 78–100)
PLATELET # BLD AUTO: 202 10E9/L (ref 150–450)
PLATELET # BLD AUTO: 268 10E9/L (ref 150–450)
POTASSIUM SERPL-SCNC: 4.1 MMOL/L (ref 3.4–5.3)
RBC # BLD AUTO: 4.79 10E12/L (ref 4.4–5.9)
RBC # BLD AUTO: 4.9 10E12/L (ref 4.4–5.9)
SODIUM SERPL-SCNC: 138 MMOL/L (ref 133–144)
SPECIMEN EXP DATE BLD: NORMAL
SPECIMEN EXP DATE BLD: NORMAL
WBC # BLD AUTO: 9.4 10E9/L (ref 4–11)
WBC # BLD AUTO: 9.7 10E9/L (ref 4–11)

## 2017-06-23 PROCEDURE — 25000132 ZZH RX MED GY IP 250 OP 250 PS 637: Performed by: INTERNAL MEDICINE

## 2017-06-23 PROCEDURE — 85027 COMPLETE CBC AUTOMATED: CPT | Performed by: INTERNAL MEDICINE

## 2017-06-23 PROCEDURE — 86900 BLOOD TYPING SEROLOGIC ABO: CPT | Performed by: FAMILY MEDICINE

## 2017-06-23 PROCEDURE — 25000128 H RX IP 250 OP 636: Performed by: FAMILY MEDICINE

## 2017-06-23 PROCEDURE — 36415 COLL VENOUS BLD VENIPUNCTURE: CPT | Performed by: FAMILY MEDICINE

## 2017-06-23 PROCEDURE — 86850 RBC ANTIBODY SCREEN: CPT | Performed by: FAMILY MEDICINE

## 2017-06-23 PROCEDURE — 85027 COMPLETE CBC AUTOMATED: CPT | Performed by: FAMILY MEDICINE

## 2017-06-23 PROCEDURE — 25000128 H RX IP 250 OP 636: Performed by: INTERNAL MEDICINE

## 2017-06-23 PROCEDURE — 25000132 ZZH RX MED GY IP 250 OP 250 PS 637: Performed by: PHYSICIAN ASSISTANT

## 2017-06-23 PROCEDURE — 86901 BLOOD TYPING SEROLOGIC RH(D): CPT | Performed by: FAMILY MEDICINE

## 2017-06-23 PROCEDURE — 85610 PROTHROMBIN TIME: CPT | Performed by: FAMILY MEDICINE

## 2017-06-23 PROCEDURE — 36415 COLL VENOUS BLD VENIPUNCTURE: CPT | Performed by: INTERNAL MEDICINE

## 2017-06-23 PROCEDURE — 97165 OT EVAL LOW COMPLEX 30 MIN: CPT | Mod: GO

## 2017-06-23 PROCEDURE — 00000146 ZZHCL STATISTIC GLUCOSE BY METER IP

## 2017-06-23 PROCEDURE — 80048 BASIC METABOLIC PNL TOTAL CA: CPT | Performed by: INTERNAL MEDICINE

## 2017-06-23 PROCEDURE — 97110 THERAPEUTIC EXERCISES: CPT | Mod: GO

## 2017-06-23 PROCEDURE — 40000275 ZZH STATISTIC RCP TIME EA 10 MIN

## 2017-06-23 PROCEDURE — 99239 HOSP IP/OBS DSCHRG MGMT >30: CPT | Performed by: INTERNAL MEDICINE

## 2017-06-23 PROCEDURE — 40000133 ZZH STATISTIC OT WARD VISIT

## 2017-06-23 PROCEDURE — 74176 CT ABD & PELVIS W/O CONTRAST: CPT

## 2017-06-23 RX ORDER — ATROPINE SULFATE 0.1 MG/ML
INJECTION INTRAVENOUS
Status: DISPENSED
Start: 2017-06-23 | End: 2017-06-23

## 2017-06-23 RX ORDER — LEVOTHYROXINE SODIUM 75 UG/1
75 TABLET ORAL
Status: DISCONTINUED | OUTPATIENT
Start: 2017-06-24 | End: 2017-06-23

## 2017-06-23 RX ORDER — NITROGLYCERIN 0.4 MG/1
TABLET SUBLINGUAL
Qty: 25 TABLET | Refills: 1 | Status: SHIPPED | OUTPATIENT
Start: 2017-06-23

## 2017-06-23 RX ADMIN — MORPHINE SULFATE 2 MG: 2 INJECTION, SOLUTION INTRAMUSCULAR; INTRAVENOUS at 01:05

## 2017-06-23 RX ADMIN — ASPIRIN 81 MG: 81 TABLET, COATED ORAL at 08:46

## 2017-06-23 RX ADMIN — LEVOTHYROXINE SODIUM 50 MCG: 50 TABLET ORAL at 06:16

## 2017-06-23 RX ADMIN — TICAGRELOR 90 MG: 90 TABLET ORAL at 08:46

## 2017-06-23 RX ADMIN — SODIUM CHLORIDE 250 ML: 9 INJECTION, SOLUTION INTRAVENOUS at 00:45

## 2017-06-23 ASSESSMENT — PAIN DESCRIPTION - DESCRIPTORS: DESCRIPTORS: SHARP;ACHING

## 2017-06-23 ASSESSMENT — ACTIVITIES OF DAILY LIVING (ADL): PREVIOUS_RESPONSIBILITIES: MEAL PREP;HOUSEKEEPING;LAUNDRY;SHOPPING;YARDWORK;MEDICATION MANAGEMENT;FINANCES;DRIVING;WORK

## 2017-06-23 NOTE — PLAN OF CARE
Problem: Goal Outcome Summary  Goal: Goal Outcome Summary  Outcome: Improving  Pt is A&O. Denies pain. Monitor shows SR with 1st degree AVB. R femoral sheath pulled at 1800. Manual pressure held for about 20 minutes with Thrombix. Site is [facility]. Pt is on bedrest until midnight. Understanding CAD binder reviewed with pt and printed info about new meds given to pt. Plan is for d/c home tomorrow after CR.

## 2017-06-23 NOTE — PROVIDER NOTIFICATION
MD Notification    Notified Person:  MD    Notified Persons Name:  Sandra    Notification Date/Time: 6/23/17 @  0130    Notification Interaction:  Talked with Physician    Purpose of Notification: Updated with CT results, please advise status of IVF. Pt given IV Morphine 2mg, no improvement of back pain thus far.     Orders Received: VOTORB discontinue IVF. Strongly encouraged to call hospitalists with further issues regarding back pain.     Comments:

## 2017-06-23 NOTE — CODE/RAPID RESPONSE
"House MD note. Re: Near syncopal event, new onset of low back pain.    46 year old male had a PCI earlier today with stenting of LAD. Returned to CICU and had his right groin sheath removed at approximately 18:00 hours. A short while ago the patient got up and used the bathroom and urinated and returned to the bedside chair where he looked very pale and was noted to have a heart rate in the 30's. He was not given atropine and his rate gradually increased into the 60's. He was placed in a supine position and given a 250 cc NS bolus and a RRT was called. He then complained of the onset of severe low back pain and lower abdominal pain. His BP was 81 systolic. His right groin site looks good with no swelling or hematoma formation visible.     Exam: He is alert and cooperative, but appears pale and in discomfort.  BP 95/75  Pulse 64  Temp 97.7  F (36.5  C) (Oral)  Resp 14  Ht 1.803 m (5' 10.98\")  Wt 85.9 kg (189 lb 4.8 oz)  SpO2 95%  BMI 26.41 kg/m2  Heent: Grossly normal  Chest: CTA to anterior auscultation.  Cor: RRR, no murmurs, rubs, gallops  Abd: Soft, no palpable masses or megaly, hypoactive bowel sounds, non tender    Hgb: 14.5 now, was 15.4 pre coronary angiogram .    Assessment: Probable post micturition near syncopal event, vaso-vagal syncope. Rule out retroperitoneal hemorrhage.    Plan: Discussed with Dr. Flores, Cardiology, who agrees that we should rule out a retroperitoneal hemorrhage by obtaining a non contrast abdominal CT scan now. He will have a type and screen performed and will go to the CT scanner now.    Nam Quijano MD  6/23/2017  12:55 AM      Addendum: CT scan shows no evidence of retroperitoneal hemorrhage. Patient's BP now 115 systolic. Continues to have low back pain and is given morphine for pain. Further orders per cardiology.    Nam Quijano MD  6/23/2017  1:18 AM      "

## 2017-06-23 NOTE — PROGRESS NOTES
[facility]    Cardiology Progress Note    Date of Service (when I saw the patient): 06/23/2017     Assessment & Plan   Chase Blackwell is a 46 year old male who was admitted on 6/21/2017 for one month of exertional chest pain. Abnormal stress echo with anteroseptal WMA, no ST changes, no troponin elevation. Former smoker, TSH 37, has stopped his synthriod    Progressive exertional angina  -abnl stress echo in distrubution of LAD. EF normal at rest, 40-45% with exercise, no ST changes on echo, negative trop  -Coronary angiogram yesterday, single vessel high grade 95% m LAD stenosis treated with 3.5 x 18 mm MCIHELLE  -started on aspirin and brillinta, BB and ACEi  -episode of vasovagal on BB and ACE after he got up from bed rest to go to the bathroom. No recurrent episodes. Pt now refuses both of these medications. Discussed reason for them and tried to get him to take at least an ACEi, he does not want to do that for now. He is running low blood pressures, SBP less than 100, HRs 50s. He and significant other report low home BPs. We can reassess BP in out pt setting, he may consider ACEi at that time.  -did well in cardiac rehab  -refuses to take statin, said he took pravastatin in the past and had severe muscle aches. Discussed that we could try other statins to see if he tolerates them, he does not want to try.  -cardiac rehab    Dyslipidemia    Lipitor 40- pt refusing    Hypothyroid  -TSH 37, Free T4 0.64  -restarted on synthroid which he had stopped on his own.  Recommend he stay on thyroid supplementation and f/u with PMD     Follow up with Nayana July 10, BNP  Follow up with Dr. Barlow July 31 at 1:30 (Dr. Raymond and Dr. Vizcarra have no openings until fall)    Interval History   One episode of vasovagal after urinated and went back to bed, tele showed junctional bradycardia, none since. Did well in cardiac rehab without symptoms.    Physical Exam   Temp: 97.8  F (36.6  C) Temp src: Oral BP: 100/63  Pulse: 64 Heart Rate: 58 Resp: 11 SpO2: 98 % O2 Device: None (Room air) Oxygen Delivery: 2 LPM  Vitals:    06/21/17 2122 06/22/17 0053   Weight: 86.2 kg (190 lb) 85.9 kg (189 lb 4.8 oz)     Vital Signs with Ranges  Temp:  [97.6  F (36.4  C)-97.8  F (36.6  C)] 97.8  F (36.6  C)  Pulse:  [63-64] 64  Heart Rate:  [34-76] 58  Resp:  [7-21] 11  BP: ()/(47-90) 100/63  SpO2:  [93 %-100 %] 98 %  I/O last 3 completed shifts:  In: 1000 [P.O.:1000]  Out: 1400 [Urine:1400]    Constitutional     alert and oriented, in no acute distress.     Skin     warm and dry to touch    ENT     no pallor or cyanosis    Neck    Supple, JVP normal, no carotid bruit    Chest     no tenderness to palpation    Lungs  clear to auscultation     Cardiac  regular rhythm, S1 normal, S2 normal, No S3 or S4, no murmurs, no rubs    Abdomen     abdomen soft, bowel sounds normoactive, no hepatosplenomegaly    Extremities and Back     No edema observed.        Neurological     no gross motor deficits noted, affect appropriate, oriented to time, person and place.    Medications        [START ON 6/24/2017] levothyroxine  125 mcg Oral QAM AC     atorvastatin  40 mg Oral Daily     sodium chloride (PF)  3 mL Intracatheter Q8H     aspirin EC  81 mg Oral Daily     ticagrelor  90 mg Oral Q12H     metoprolol  25 mg Oral BID     lisinopril  10 mg Oral Daily       Data   Results for orders placed or performed during the hospital encounter of 06/21/17 (from the past 24 hour(s))   Heart Cath Left heart cath    Narrative    INDICATION: Unstable angina, abnormal stress test  PROCEDURES PERFORMED: Coronary angiography, left ventriculogram, PCI  HISTORY: Refer to H&P  PROCEDURE: After discussing risks, benefits, goals, alternatives and  complications of the procedure, the patient was brought to the  catheterization lab. Right and left groins were prepped and draped in  sterile fashion. Under 1% Lidocaine a 4 Setswana sheath was placed in  the right femoral artery using  a modified Seldinger technique.  Coronary angiography was performed with a 4 Barbadian JL 4.5 and 3 DRC  diagnostic catheters. Standard angle and views were used for the right  and left coronaries. Left ventriculography was performed with 4 Barbadian  pigtail catheter in the MONIQUE projection.  Left heart catheterization  was performed.  FINDINGS: See below  LEFT MAIN CORONARY ARTERY: Minimal  LEFT ANTERIOR DESCENDING ARTERY: High-grade 95% mid vessel stenosis  CIRCUMFLEX ARTERY: No significant disease  RIGHT CORONARY ARTERY: Dominant vessel. Mild luminal irregularities  HEMODYNAMICS: Aortic pressures is 113/71 mean of 91, LV systolic  pressures is 1:15, LV end-diastolic pressure is 18. Left  ventriculography revealed 55%  PERCUTANEOUS CORONARY INTERVENTION: After reviewing the coronary  angiography findings with the patient, we elected to treat the mid  LAD. A 6 Barbadian 3.75 EBU guiding catheter was used to engage the  ostium of the left main. This provided an adequate support for the  procedure. A 014 cougar wire was used to successfully the mid LAD  lesion. The lesion was pre-dilated with a A.0 compliant balloon. After  adequate angioplasty result, a 3.5 x 18 drug-eluting stent was  deployed inside the lesion using 16 atmosphere pressure. The stent was  post-dilated with not performed non-compliant balloon. This produced  an excellent angiographic result.  The patient received Valenta and Aspirin prior to intervention. He was  loaded with Angiomax during the procedure. The femoral sheath was  secured in place and patient transferred to CCU hemodynamically  stable. There were no complications.      Impression    IMPRESSION: Severe single vessel coronary disease. Status post MICHELLE  PLAN: Medical management. Femoral  sheath removal.. Cardiac  rehabilitation.    Conscious sedation. Versed total 5 mg, 150 mg fentanyl, total sedation  time 55 minutes    SUKHJINDER MUÑOZ MD   EKG 12-lead, tracing only    Result Value Ref Range     Interpretation ECG Click View Image link to view waveform and result    CBC with platelets   Result Value Ref Range    WBC 9.4 4.0 - 11.0 10e9/L    RBC Count 4.90 4.4 - 5.9 10e12/L    Hemoglobin 14.5 13.3 - 17.7 g/dL    Hematocrit 42.5 40.0 - 53.0 %    MCV 87 78 - 100 fl    MCH 29.6 26.5 - 33.0 pg    MCHC 34.1 31.5 - 36.5 g/dL    RDW 14.0 10.0 - 15.0 %    Platelet Count 268 150 - 450 10e9/L   ABO and Rh   Result Value Ref Range    ABO O     RH(D)  Pos     Specimen Expires 06/26/2017    Glucose by meter   Result Value Ref Range    Glucose 103 (H) 70 - 99 mg/dL   CT Abdomen Pelvis w/o Contrast    Narrative    CT ABDOMEN PELVIS W/O CONTRAST  6/23/2017 12:54 AM     INDICATION: Rule out retroperitoneal bleed, status post cardiac  catheterization.      TECHNIQUE: Thin axial images through the abdomen and pelvis without  contrast. Coronal reformatted images. Radiation dose for this scan was  reduced using automated exposure control, adjustment of the mA and/or  kV according to patient size, or iterative reconstruction technique.    COMPARISON: 5/26/2016.    FINDINGS: There is contrast within the intrarenal collecting systems  and the bladder compatible with contrast from recent cardiac  angiography. Liver, gallbladder, spleen, pancreas, adrenal glands and  kidneys are otherwise negative without contrast. Visualized lung bases  are clear except for minimal dependent atelectasis.    Moderate fat stranding in the right inguinal region compatible with a  small amount of contusion/hematoma relating to the recent procedure.  No large hematoma or fluid collection at this site. No retroperitoneal  hemorrhage or fluid collections within the abdomen or pelvis. No other  acute findings. No bowel obstruction. Negative appendix.      Impression    IMPRESSION:  1. Evidence of recent catheterization with some fat stranding in the  right inguinal region compatible with a small amount of  contusion/hemorrhage but no large hematoma or fluid  collections.  2. No retroperitoneal hemorrhage or other acute findings.    DENYS CALDWELL MD   Type and Screen (AM Draw)   Result Value Ref Range    ABO O     RH(D)  Pos     Antibody Screen Neg     Test Valid Only At [facility]     Specimen Expires 06/26/2017    INR   Result Value Ref Range    INR 1.04 0.86 - 1.14   Basic metabolic panel   Result Value Ref Range    Sodium 138 133 - 144 mmol/L    Potassium 4.1 3.4 - 5.3 mmol/L    Chloride 106 94 - 109 mmol/L    Carbon Dioxide 28 20 - 32 mmol/L    Anion Gap 4 3 - 14 mmol/L    Glucose 105 (H) 70 - 99 mg/dL    Urea Nitrogen 15 7 - 30 mg/dL    Creatinine 0.99 0.66 - 1.25 mg/dL    GFR Estimate 81 >60 mL/min/1.7m2    GFR Estimate If Black >90   GFR Calc   >60 mL/min/1.7m2    Calcium 8.9 8.5 - 10.1 mg/dL   CBC with platelets   Result Value Ref Range    WBC 9.7 4.0 - 11.0 10e9/L    RBC Count 4.79 4.4 - 5.9 10e12/L    Hemoglobin 14.6 13.3 - 17.7 g/dL    Hematocrit 42.6 40.0 - 53.0 %    MCV 89 78 - 100 fl    MCH 30.5 26.5 - 33.0 pg    MCHC 34.3 31.5 - 36.5 g/dL    RDW 13.9 10.0 - 15.0 %    Platelet Count 202 150 - 450 10e9/L       Kristina Rincon, REN, CNP  Cardiology  Pager:  [phone]

## 2017-06-23 NOTE — PLAN OF CARE
Problem: Goal Outcome Summary  Goal: Goal Outcome Summary  OT/CR: Pt is a 46 year old male who was admitted on 6/21 with exertional chest pain and underwent stent to LAD on 6/23.. RRT called on 6/23 for bradycardia and hypotension. Prior to admission pt was residing with his wife in a house with 2 stairs to enter and 30 within. Pt reports not participating in regular exercise at baseline. He works full time making heart catheters.      Discharge Planner OT   Patient plan for discharge: Home  Current status: Education provided regarding effort scale and signs and symptoms of exercise intolerance. Pt exercised on treadmill x 15:00 at 1.1-1.8 mph and completed 15 stairs independently with stable CV response and pt report of 0/10 on effort scale. BP 99/66 and HR 70 at start of session. /58 and HR  with activity.   Barriers to return to prior living situation: None  Recommendations for discharge: Home with OP CR  Rationale for recommendations: Pt would benefit from continued monitored exercise and risk factor education        Entered by: Anna Raya 06/23/2017 11:56 AM

## 2017-06-23 NOTE — PLAN OF CARE
Problem: Goal Outcome Summary  Goal: Goal Outcome Summary  Outcome: No Change  Off of bedrest @ 0000, pt ambulated to bathroom and upon returning to bed  pt c/o being in bed too long and requested to sit in the recliner chair for awhile. Upon sitting pt became nauseated. BP cuff applied, before results were available pt's HR dropped to the 30's. Pt became very pale, diaphoretic, and decreased responsiveness. RRT called, fluid bolus administered, pads applied. As BP/HR improved pt began to c/o lower back pain. Rt groin c/d/i, no bruit, CMS intact. Pt down for CT imaging, negative for retroperitoneal hemorrhage. 2mg Morphine given for back pain, with gradual improvement. Pt has remained in bed with HR in the 50's. BP 90's/50-60's. Slept intermittently between cares. Plan for: cardiac rehab.

## 2017-06-23 NOTE — PROGRESS NOTES
06/23/17 0900   Quick Adds   Type of Visit Initial Occupational Therapy Evaluation   Living Environment   Lives With spouse   Living Arrangements house   Number of Stairs to Enter Home 2   Number of Stairs Within Home 30   Transportation Available family or friend will provide;car   Living Environment Comment Pt works full time making catheters for heart procedures.    Self-Care   Usual Activity Tolerance excellent   Current Activity Tolerance good   Regular Exercise no   Functional Level Prior   Ambulation 0-->independent   Transferring 0-->independent   Toileting 0-->independent   Bathing 0-->independent   Dressing 0-->independent   Eating 0-->independent   Communication 0-->understands/communicates without difficulty   Swallowing 0-->swallows foods/liquids without difficulty   Cognition 0 - no cognition issues reported   Fall history within last six months no   General Information   Onset of Illness/Injury or Date of Surgery - Date 06/21/17   Referring Physician Zackery   Patient/Family Goals Statement Return home   Additional Occupational Profile Info/Pertinent History of Current Problem Pt is a 46 year old who was admitted on 6/21 with exertional chest pain and underwent stent to LAD. RRT called on 6/23 for bradycardia and hypotension. PMH includes hypothyroidism, hyperlipidemia, GERD.    Precautions/Limitations no known precautions/limitations   Pain Assessment   Patient Currently in Pain No   Transfer Skill: Bed to Chair/Chair to Bed   Level of [place]: Bed to Chair independent   Transfer Skill: Sit to Stand   Level of [place]: Sit/Stand independent   Transfer Skill: Toilet Transfer   Level of [place]: Toilet independent   Balance   Balance Quick Add No deficits identified   Bathing   Level of [place] independent   Upper Body Dressing   Level of [place]: Dress Upper Body independent   Lower Body Dressing   Level of [place]: Dress Lower Body independent   Toileting   Level of  "[place]: Toilet independent   Grooming   Level of [place]: Grooming independent   Eating/Self Feeding   Level of [place]: Eating independent   Instrumental Activities of Daily Living (IADL)   Previous Responsibilities meal prep;housekeeping;laundry;shopping;yardwork;medication management;finances;driving;work   Activities of Daily Living Analysis   Impairments Contributing to Impaired Activities of Daily Living strength decreased   General Therapy Interventions   Planned Therapy Interventions strengthening;home program guidelines;progressive activity/exercise;risk factor education   Clinical Impression   Criteria for Skilled Therapeutic Interventions Met yes, treatment indicated   OT Diagnosis Decreased functional tolerance for IADLs   Influenced by the following impairments Endurance, strength    Assessment of Occupational Performance 1-3 Performance Deficits   Clinical Decision Making (Complexity) Low complexity   Therapy Frequency 2 times/day   Predicted Duration of Therapy Intervention (days/wks) 2 days   Anticipated Discharge Disposition Home with Outpatient Therapy   Risks and Benefits of Treatment have been explained. Yes   Patient, Family & other staff in agreement with plan of care Yes   [facility] [place] \"6 Clicks\"   2016, Trustees of [facility], under license to [employer].  All rights reserved.   6 Clicks Short Forms Daily Activity Inpatient Short Form   [facility]  \"6 Clicks\" Daily Activity Inpatient Short Form   1. Putting on and taking off regular lower body clothing? 4 - None   2. Bathing (including washing, rinsing, drying)? 4 - None   3. Toileting, which includes using toilet, bedpan or urinal? 4 - None   4. Putting on and taking off regular upper body clothing? 4 - None   5. Taking care of personal grooming such as brushing teeth? 4 - None   6. Eating meals? 4 - None   Daily Activity Raw Score (Score out of 24.Lower scores equate to lower levels of " function) 24   Total Evaluation Time   Total Evaluation Time (Minutes) 8

## 2017-06-23 NOTE — PLAN OF CARE
Problem: Goal Outcome Summary  Goal: Goal Outcome Summary  Cardiac Rehab Discharge Summary     Reason for therapy discharge:    Discharged to home with outpatient therapy.     Progress towards therapy goal(s). See goals on Care Plan in [facility] electronic health record for goal details.  Goals partially met.  Barriers to achieving goals:   discharge on same date as initial evaluation.     Therapy recommendation(s):    Continued therapy is recommended.  Rationale/Recommendations:  Pt would benefit from OP CR for continued monitored exercise and risk factor education .

## 2017-06-23 NOTE — PLAN OF CARE
Problem: Individualization  Goal: Patient Preferences  Outcome: Adequate for Discharge Date Met:  06/23/17  D/C to home with friend, up in the room, no chest pain, tolerated diet. Patient showed understanding for D/C paper work.

## 2017-06-23 NOTE — CONSULTS
"NUTRITION EDUCATION    REASON FOR ASSESSMENT:  Nutrition education on American Heart Association (AHA) Heart Healthy Diet.    NUTRITION HISTORY:  Visited with pt this morning  He tells me that he tries to eat healthy - \"there are improvements I can make though\"  Likes fruits and veggies, but doesn't eat them daily  Enjoys having fish  Takes a krill oil supplement every night  Admits that his \"weakness\" is sweets - \"especially ice cream\"  Does not drink sugared beverages    Pt notes that he has a hx of high cholesterol  DOES NOT want to take a statin    Chol 224  HDL 35          CURRENT DIET ORDER:  2400 mg Na, LSF    NUTRITION DIAGNOSIS:  Food- and nutrition-related knowledge deficit R/t no prior diet education AEB this is new dx for pt     INTERVENTIONS:  Nutrition Prescription:    Recommended AHA Heart Healthy Diet    Implementation:     Nutrition Education (Content):  a) reviewed the Nutrition chapter in the  Understanding Artery Disease  binder  b) reviewed AHA Heart Healthy Diet guidelines    Nutrition Education (Application):  a) Discussed current eating habits and recommended alternative food choices.  Encouraged pt to increase daily fruits and veggies, add in more fiber (oatmeal, fruits), try nuts as a snack, eat fish at least 2x per week and limit beef to 1-2 times per week.  Also discussed using plant stanols (2 gm/day) to help with lowering cholesterol (CholestOff supplement).    Anticipate good compliance    Diet Education - refer to Education flowsheet    Goals:    Patient verbalizes understanding of diet     All of the above goals met during education session    Follow Up/Monitoring:    Provided RD contact information for future questions        "

## 2017-06-23 NOTE — PROGRESS NOTES
[facility]  Hospitalist Progress Note  Ayesha Hernandez MD    Assessment & Plan   Mr. Chase Blackwell is a 46-year-old male with past medical history of untreated hypothyroidism, hyperlipidemia and GERD, who presented 6/22/17 complaining of chest pain typical for angina.      Chest pain with abnormal stress test, s/p MICHELLE to LAD on 02/22:   Pt with one month of chest pain with exertion and associated SOB. Worsening recently with decreased level of activity. Symptoms relieved after 10-15 minutes of rest. Alleviated by nitro in the ED. Risk factors for CAD include untreated hyperlipidemia and family hx of CAD (grandfather). CMP, CBC unremarkable. Troponin negative X3.  Stress echo abnormal with large area of anterior,septal and apical ischemia with worsening LV function, appears to be in LAD distribution. LVEF 40-45%.  Coronary angio 02/22 showed 95% mid LAD stenosis, treated with one MICHELLE. LVEF 55%.  Episode of vasovagal last night 02/22. CT abdomen/pelvis (post-catheter) neg for retroperitoneal hemorrhage or significant hematoma in right groin    -Cont on ASA 81 mg daily, Brilinta 60 mg bid, Lipitor 40 mg daily, lisinopril 10 mg daily, metoprolol 25 mg bid.   -Cont telemetry  -Cardiac rehab  -Stressed the importance of medical compliance  -Cont management per cardiology    DLP.  Lipid profile 224/125/35/189.   -Cont on Lipitor 40 mg po daily     Hypothyroidism, untreated:   TSH 37.14 (H), T4 0.64 (L).   Pt has been taking iodine supplements for this. Pt previously on Synthroid 150 mcg po qd, but stopped this.   -Started on Synthroid 125 mcg po qd   -Repeat TSH in 4-6 weeks     History of GERD:   The patient states he is off of Prilosec.   - Continue to monitor symptoms.     DVT Prophylaxis: Ambulate every shift  Code Status: Full Code     Disposition: Pending cardiac rehab and cardiology input.    D/W RN.    Interval History   Episode of vasovagal last night. He reports no cp, sob, or other  complaints.    -Data reviewed today: I reviewed all new labs and imaging over the last 24 hours. I personally reviewed the monitor showing NSR.    Physical Exam   Temp: 97.7  F (36.5  C) Temp src: Oral BP: 93/62 Pulse: 64 Heart Rate: 54 Resp: 14 SpO2: 98 % O2 Device: Nasal cannula Oxygen Delivery: 2 LPM  Vitals:    06/21/17 2122 06/22/17 0053   Weight: 86.2 kg (190 lb) 85.9 kg (189 lb 4.8 oz)     Vital Signs with Ranges  Temp:  [97.6  F (36.4  C)-97.7  F (36.5  C)] 97.7  F (36.5  C)  Pulse:  [63-64] 64  Heart Rate:  [34-76] 54  Resp:  [7-21] 14  BP: ()/(47-90) 93/62  SpO2:  [93 %-100 %] 98 %  I/O's Last 24 hours  I/O last 3 completed shifts:  In: 1000 [P.O.:1000]  Out: 1400 [Urine:1400]    Constitutional: Comfortable, no acute distress  HEENT: No conjunctival pallor.  Neurologic: Awake, alert  Neck: Supple, no elevated JVP  Respiratory: Clear to auscultation  Cardiovascular: Normal S1 and S2. Regular rhythm and rate, no murmur  GI: Abdomen soft, not tender, not distended  Extremities: No calf tenderness, no edema  Skin/integument: No acute rash, no cyanosis    Medications   All medications were reviewed.       [START ON 6/24/2017] levothyroxine  125 mcg Oral QAM AC     atorvastatin  40 mg Oral Daily     sodium chloride (PF)  3 mL Intracatheter Q8H     aspirin EC  81 mg Oral Daily     ticagrelor  90 mg Oral Q12H     metoprolol  25 mg Oral BID     lisinopril  10 mg Oral Daily        Data     Recent Labs  Lab 06/23/17  0510 06/23/17  0115 06/23/17  0025 06/22/17  0540 06/22/17  0125 06/21/17  2145   WBC 9.7  --  9.4  --   --  5.6   HGB 14.6  --  14.5  --   --  15.4   MCV 89  --  87  --   --  89     --  268  --   --  209   INR  --  1.04  --   --   --   --      --   --   --   --  139   POTASSIUM 4.1  --   --   --   --  3.8   CHLORIDE 106  --   --   --   --  107   CO2 28  --   --   --   --  27   BUN 15  --   --   --   --  15   CR 0.99  --   --   --   --  0.95   ANIONGAP 4  --   --   --   --  5   TOY 8.9   --   --   --   --  8.7   *  --   --   --   --  92   ALBUMIN  --   --   --   --   --  4.0   PROTTOTAL  --   --   --   --   --  8.0   BILITOTAL  --   --   --   --   --  0.6   ALKPHOS  --   --   --   --   --  59   ALT  --   --   --   --   --  24   AST  --   --   --   --   --  27   LIPASE  --   --   --   --   --  182   TROPI  --   --   --  <0.015The 99th percentile for upper reference range is 0.045 ug/L.  Troponin values in the range of 0.045 - 0.120 ug/L may be associated with risks of adverse clinical events. <0.015The 99th percentile for upper reference range is 0.045 ug/L.  Troponin values in the range of 0.045 - 0.120 ug/L may be associated with risks of adverse clinical events. <0.015The 99th percentile for upper reference range is 0.045 ug/L.  Troponin values in the range of 0.045 - 0.120 ug/L may be associated with risks of adverse clinical events.       Recent Results (from the past 24 hour(s))   CT Abdomen Pelvis w/o Contrast    Narrative    CT ABDOMEN PELVIS W/O CONTRAST  6/23/2017 12:54 AM     INDICATION: Rule out retroperitoneal bleed, status post cardiac  catheterization.      TECHNIQUE: Thin axial images through the abdomen and pelvis without  contrast. Coronal reformatted images. Radiation dose for this scan was  reduced using automated exposure control, adjustment of the mA and/or  kV according to patient size, or iterative reconstruction technique.    COMPARISON: 5/26/2016.    FINDINGS: There is contrast within the intrarenal collecting systems  and the bladder compatible with contrast from recent cardiac  angiography. Liver, gallbladder, spleen, pancreas, adrenal glands and  kidneys are otherwise negative without contrast. Visualized lung bases  are clear except for minimal dependent atelectasis.    Moderate fat stranding in the right inguinal region compatible with a  small amount of contusion/hematoma relating to the recent procedure.  No large hematoma or fluid collection at this site. No  retroperitoneal  hemorrhage or fluid collections within the abdomen or pelvis. No other  acute findings. No bowel obstruction. Negative appendix.      Impression    IMPRESSION:  1. Evidence of recent catheterization with some fat stranding in the  right inguinal region compatible with a small amount of  contusion/hemorrhage but no large hematoma or fluid collections.  2. No retroperitoneal hemorrhage or other acute findings.    DENYS CALDWELL MD

## 2017-06-23 NOTE — DISCHARGE INSTRUCTIONS
Going Home after an Angioplasty or Stent Placement (Cardiac)  ______________________________________________    Patient Name: Chase Blackwell  Date of Procedure: June 23, 2017    After you go home:    Have an adult stay with you for 24 hours.    Drink plenty of fluids.    You may eat your normal diet, unless your doctor tells you otherwise.    For 24 hours:    Relax and take it easy.    Do NOT smoke.    Do NOT make any important or legal decisions.    Do NOT drive or operate machines at home or at work.    Do NOT drink alcohol.    Remove the Band-Aid after 24 hours. If there is minor oozing, apply another Band-aid and remove it after 12 hours.    For 2 days, do NOT have sex or do any heavy exercise.    Do NOT take a bath, or use a hot tub or pool for at least 3 days. You may shower.    Care of groin site  It is normal to have a small bruise or lump at the site.    Do not scrub the site.    For the first 2 days: Do not stoop or squat. When you cough, sneeze or move your bowels, hold your hand over the puncture site and press gently.    Do not lift more than 10 pounds for at least 3 to 5 days.    Do not use lotion or powder near the puncture site for 3 days.    If you start bleeding from the site in your groin, lie down flat and press firmly  on the site. Call your doctor as soon as you can.    Medicines    If you have started taking Plavix or Effient, do not stop taking it until you talk to your heart doctor (cardiologist).    If you are on metformin (Glucophage), do not restart it until you have blood tests (within 2 to 3 days after discharge). When your doctor tells you it is safe, you may restart the metformin.    If you have stopped any other medicines, check with your nurse or provider about when to restart them.    Call 911 right away if you have bleeding that is heavy or does not stop.    Call your doctor if:    You have a large or growing hard lump around the site.    The site is red, swollen, hot or  tender.    Blood or fluid is draining from the site.    You have chills or a fever greater than 101 F (38 C).    Your leg feels numb or cool.    You have hives, a rash or unusual itching.      [facility] [place]:  [phone] (7 days a week)      We will contact you tomorrow for follow-up. Number where we can reach you:

## 2017-06-23 NOTE — PROGRESS NOTES
RRT called for bradycardia and hypotension.   No RT orders at this time.   6/23/2017  Laurie Hammond RT

## 2017-06-23 NOTE — PROGRESS NOTES
Care Coordination:    Bedside nurse requested care coordination to check readiness of d/c plan.  Noted future order for BMP without specified date.  Kristina clarifies she would like this prior to the [facility] appointment.  Called schedulers and requested lab appointment.      RN states pt can make own PCP apt within 1-2wks.  Of note, pt is low readmission risk score (otilio).    Le Ponce RN, BSN  Formerly [facility] Care Coordinator   Mobile Phone: [phone]

## 2017-06-23 NOTE — DISCHARGE SUMMARY
DATE OF ADMISSION:  06/21/2017      DATE OF DISCHARGE:  06/23/2017        PRIMARY CARE PROVIDER:  Dr. Live Lott.      CHIEF COMPLAINT:  Chest pain.      DISCHARGE DIAGNOSES:   1.  Angina due to high grade LAD stenosis, status post drug-eluting stent.   2.  Dyslipidemia.   3.  Hypothyroidism.   4.  Gastroesophageal reflux disease.   5.  Medical noncompliance.      CONSULTANTS:  Dr. Lorin Raymond of Cardiology Service.      DIAGNOSTIC STUDIES:   1.  Chest x-ray:  It showed no acute abnormality.   2.  Exercise stress echocardiogram:  It revealed large area of anterior, septal and apical ischemia with worsening LV function; appeared to be in LAD distribution.  There were no ST segment changes observed with stress.  The visual ejection fraction was estimated at 40% -45%.   3.  Coronary angiogram was achieved on 06/21/2017 and showed a high-grade single vessel disease (95% mid LAD stenosis), which was treated with a drug-eluting stent.  The LV systolic function was calculated at 55% by ventriculography.   4.  CT abdomen/pelvis without contrast:  There was evidence of recent catheterization with some fat stranding in the right inguinal region compatible with a small amount of contusion/hemorrhage but no large hematoma or fluid collections.  There was no retroperitoneal hemorrhage or other acute findings.      DISCHARGE MEDICATIONS:      Review of your medicines      START taking       Dose / Directions    aspirin 81 MG EC tablet   Used for:  Coronary artery disease involving native coronary artery of native heart with unstable angina pectoris (H)        Dose:  81 mg   Take 1 tablet (81 mg) by mouth daily   Quantity:  60 tablet   Refills:  11       nitroglycerin 0.4 MG sublingual tablet   Commonly known as:  NITROSTAT   Used for:  Coronary artery disease involving native coronary artery of native heart with unstable angina pectoris (H)        For chest pain place 1 tablet under the tongue every 5 minutes for 3 doses. If  symptoms persist 5 minutes after 1st dose call 911.   Quantity:  25 tablet   Refills:  1       ticagrelor 90 MG tablet   Commonly known as:  BRILINTA   Used for:  Coronary artery disease involving native coronary artery of native heart with unstable angina pectoris (H)        Dose:  90 mg   Take 1 tablet (90 mg) by mouth every 12 hours   Quantity:  60 tablet   Refills:  11            Where to get your medicines      These medications were sent to [facility] [id number] [address], [place] [address]     Phone:  [phone]      aspirin 81 MG EC tablet     nitroglycerin 0.4 MG sublingual tablet     ticagrelor 90 MG tablet              ALLERGIES AND INTOLERANCES:  Depakote.      DISPOSITION:  Home.      CONDITION ON DISCHARGE:  Stable.      DIET:  Low saturated fat/cholesterol diet.      ACTIVITY:  As tolerated.      FOLLOWUP PLAN:   1.  He is to follow up with the heart clinic in 10-14 days.   2.  He is to follow up with the primary care physician in 1-2 weeks.      SIGNIFICANT LABORATORY DATA AT TIME OF DISCHARGE:  Creatinine 0.99, glucose 105, hemoglobin 14.6.      PENDING TEST RESULTS:  None.      HISTORY OF PRESENT ILLNESS:  Mr. Chase Blackwell is a 46-year-old  gentleman with a past medical history notable for GERD, dyslipidemia, hypothyroidism and medical noncompliance who presented to the Emergency Department for evaluation of chest pain.  Reportedly for approximately 1 month he had had episodes of exertional chest discomfort associated with shortness of breath and radiation to the left arm.  In the Emergency Department, troponin I was less than 0.015.  The electrocardiogram revealed normal sinus rhythm and no acute ischemic changes. The chest x-ray showed no acute abnormality.  D-dimer was less than 0.3.  He had an unremarkable hematology and chemistry profile.  He was subsequently admitted to the hospital for further  management.      For detailed history and physical examination, please refer to the dictated admission note by Dr. Randy Ramos on 06/22/2017.      HOSPITAL COURSE:     1.  High-grade single vessel coronary artery disease with angina:  The patient presented with exertional angina and associated shortness of breath.  The patient was ruled out for acute myocardial infarction by serial troponin I levels.  He subsequently underwent an exercise stress echocardiogram which was abnormal demonstrating large area of anterior, septal and apical ischemia with worsening LV function which appeared to be in LAD distribution.  LV ejection fraction post stress was 40% -45%.  Cardiology consult was obtained and he was evaluated by Dr. Lorin Raymond.  Subsequently, he underwent coronary angiogram which showed 95% single vessel mid LAD stenosis, treated with a drug-eluting stent.  The ventriculogram showed LV ejection fraction of 55%.  Post-angiogram, he was started on aspirin 81 mg p.o. daily, Brilinta 90 mg p.o. b.i.d., Lipitor 40 mg p.o. daily, Lipitor 10 mg p.o. daily and metoprolol 25 mg p.o. b.i.d.  On the night of 06/22, the patient had a vasovagal episode with hypotension and bradycardia which prompted the medical team to discontinue lisinopril and metoprolol.  CT scan of the abdomen/pelvis was obtained which showed no evidence for hemorrhage.  His hemoglobin also remained stable on the subsequent day at 14.6.  The lipid profile showed total cholesterol of 224, LDL of 125, HDL of 35 and triglyceride of 318.  The patient refused to take statin, beta blocker or ACE inhibitor with the reasoning that he would rather take natural alternative medicines.  I stressed the importance of taking aspirin and Brilinta with no interruption and warned the patient that he would otherwise be at very high risk of in-stent thrombosis and massive heart attack. Throughout this short hospitalization, the patient had no recurrence of chest pain.   At the time of discharge, he was hemodynamically stable with blood pressure of 99/66 and a heart rate of 70.     2.  Hypothyroidism:  He was taking iron supplement for his hypothyroidism.  TSH level was elevated at 37.14.  The patient was placed on levothyroxine 125 mg p.o. daily, but he refused to take this medication.      I would like to thank Dr. Live Lott for allowing the Hospitalist Service to participate in the care of this patient.      CODE STATUS:  Full code.      TOTAL DISCHARGE TIME:  Approximately 40 minutes.         LAUREN LOCO MD             D: 2017 13:00   T: 2017 13:33   MT: CG      Name:     BERNIE MOSS   MRN:      [id number]        Account:        [id number]   :      1970           Admit Date:                                       Discharge Date: 2017      Document: [id number]       cc: Lorin Raymond MD, FACC       Live Lott MD

## 2017-06-23 NOTE — PHARMACY
Brilinta  - $3    Effient - $3    Thank you,  Cam Germain CPhT  [facility] Discharge Pharmacy Liaison  [phone]

## 2017-06-26 ENCOUNTER — TELEPHONE (OUTPATIENT)
Dept: CARDIOLOGY | Facility: CLINIC | Age: 47
End: 2017-06-26

## 2017-06-26 NOTE — TELEPHONE ENCOUNTER
Called patient and left  to discuss any post hospital d/c questions he may have, review medication changes, and confirm f/u appts. RN advised patient in  that he has an apt scheduled on 7- with [facility] Nayana Rodgers NP. Patient advised in  to call clinic with any cardiac related questions or concerns prior to his/her apt on 7-.

## 2017-06-28 ENCOUNTER — HOSPITAL ENCOUNTER (OUTPATIENT)
Dept: CARDIAC REHAB | Facility: CLINIC | Age: 47
End: 2017-06-28
Attending: NURSE PRACTITIONER
Payer: COMMERCIAL

## 2017-06-28 DIAGNOSIS — R07.9 ACUTE CHEST PAIN: ICD-10-CM

## 2017-06-28 LAB — INTERPRETATION ECG - MUSE: NORMAL

## 2017-06-28 PROCEDURE — 93797 PHYS/QHP OP CAR RHAB WO ECG: CPT | Mod: 59

## 2017-06-28 PROCEDURE — 40000116 ZZH STATISTIC OP CR VISIT

## 2017-06-28 PROCEDURE — 40000575 ZZH STATISTIC OP CARDIAC VISIT #2

## 2017-06-28 PROCEDURE — 93798 PHYS/QHP OP CAR RHAB W/ECG: CPT

## 2017-06-30 ENCOUNTER — PRE VISIT (OUTPATIENT)
Dept: CARDIOLOGY | Facility: CLINIC | Age: 47
End: 2017-06-30

## 2017-07-31 ENCOUNTER — OFFICE VISIT (OUTPATIENT)
Dept: CARDIOLOGY | Facility: CLINIC | Age: 47
End: 2017-07-31
Payer: COMMERCIAL

## 2017-07-31 VITALS
OXYGEN SATURATION: 100 % | HEART RATE: 60 BPM | WEIGHT: 195.5 LBS | HEIGHT: 71 IN | DIASTOLIC BLOOD PRESSURE: 80 MMHG | BODY MASS INDEX: 27.37 KG/M2 | SYSTOLIC BLOOD PRESSURE: 118 MMHG

## 2017-07-31 DIAGNOSIS — I25.10 CORONARY ARTERY DISEASE INVOLVING NATIVE CORONARY ARTERY OF NATIVE HEART WITHOUT ANGINA PECTORIS: Primary | ICD-10-CM

## 2017-07-31 DIAGNOSIS — E03.9 HYPOTHYROIDISM, UNSPECIFIED TYPE: ICD-10-CM

## 2017-07-31 DIAGNOSIS — I25.110 CORONARY ARTERY DISEASE INVOLVING NATIVE CORONARY ARTERY OF NATIVE HEART WITH UNSTABLE ANGINA PECTORIS (H): ICD-10-CM

## 2017-07-31 DIAGNOSIS — I20.0 UNSTABLE ANGINA (H): ICD-10-CM

## 2017-07-31 DIAGNOSIS — Z95.5 PRESENCE OF STENT IN LAD CORONARY ARTERY: ICD-10-CM

## 2017-07-31 DIAGNOSIS — E78.5 HYPERLIPIDEMIA LDL GOAL <70: ICD-10-CM

## 2017-07-31 LAB
ANION GAP SERPL CALCULATED.3IONS-SCNC: 9.9 MMOL/L (ref 6–17)
BUN SERPL-MCNC: 17 MG/DL (ref 7–30)
CALCIUM SERPL-MCNC: 9.4 MG/DL (ref 8.5–10.5)
CHLORIDE SERPL-SCNC: 105 MMOL/L (ref 98–107)
CO2 SERPL-SCNC: 31 MMOL/L (ref 23–29)
CREAT SERPL-MCNC: 1.1 MG/DL (ref 0.7–1.3)
GFR SERPL CREATININE-BSD FRML MDRD: 72 ML/MIN/1.7M2
GLUCOSE SERPL-MCNC: 114 MG/DL (ref 70–105)
POTASSIUM SERPL-SCNC: 3.9 MMOL/L (ref 3.5–5.1)
SODIUM SERPL-SCNC: 142 MMOL/L (ref 136–145)

## 2017-07-31 PROCEDURE — 36415 COLL VENOUS BLD VENIPUNCTURE: CPT | Performed by: NURSE PRACTITIONER

## 2017-07-31 PROCEDURE — 80048 BASIC METABOLIC PNL TOTAL CA: CPT | Performed by: NURSE PRACTITIONER

## 2017-07-31 PROCEDURE — 99204 OFFICE O/P NEW MOD 45 MIN: CPT | Performed by: INTERNAL MEDICINE

## 2017-07-31 NOTE — PROGRESS NOTES
"REFERRING PROVIDER:  No referring provider defined for this encounter.    PRIMARY CARE PROVIDER:  Live Lott  [facility] [address] [facility] [address]    REASON FOR VISIT/CHIEF COMPLAINT:  1. Post hospital follow-up after receiving stent to the LAD for unstable angina.    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 He was hospitalized on 21 June, 2017 with the progressive unstable angina. His troponin was negative. ECG did not show any ischemic changes. However, he had a markedly positive stress test with the LVEF of 40-45%, large exercise-induced anteroseptal wall motion abnormality at low workload, with LV dilatation. He underwent an uncomplicated coronary angiogram via right femoral arterial access and was found to have a tight 95% mid LAD stenosis that was treated with a 3.5 x 18 mm drug-eluting stent with excellent angiographic result. There was minimal disease in the other arteries.    Patient has been asymptomatic since discharge. He is tolerating dual antiplatelet therapy with aspirin 81 mg and Ticagrelor 90 mg b.i.d. He is known to have significant hyperlipidemia with a total cholesterol of 224, HDL 35, , triglycerides 318. In the past, he had limiting myalgias with pravastatin and discontinued it a few  years ago. He does not want to restart statin therapy. He also has known hypothyroidism, and self discontinued his thyroid medications over a year ago. His recent TSH is very elevated at 37.14, but patient is not keen on taking any \"prescription medications\". He has not had any recurrence of chest pain. He went to one session of  phase 2 cardiac rehabilitation and elected not to continue with it. He is back at work full-time.    His risk factors include untreated hyperlipidemia, previous history of heavy smoking (one to one and half packs a day from " "the age of 14 years to 38 years, quit eight years ago), and a family history of heart disease (his maternal grandfather and paternal grandmother  suddenly from presumed myocardial infarction).    REVIEW OF SYSTEMS:  A comprehensive 10-point review of systems was completed and the pertinent positives are documented in the history of present illness.    CURRENT MEDICATIONS:  Aspirin 81 mg daily.  Ticagrelor (Brilinta) 90 mg times daily.  Sublingual nitroglycerin glycerin 0.4 milligrams, as needed.    PAST MEDICAL HISTORY:  1.  Thyroid disease, patient self discontinued thyroid replacement therapy.   2.  Hyperlipidemia.   3.  Gastroesophageal reflux disease.   4.  Coronary artery disease with troponin negative chest pain and a positive stress test leading to drug-eluting stenting of mid LAD. 2017.    PAST SURGICAL HISTORY:  Status post hernia repair surgery.    FAMILY HISTORY:  His mother  of cancer.  His father  of multiorgan failure; he is not sure exactly how. His maternal grandfather and paternal grandmother  suddenly from presumed myocardial infarction    SOCIAL HISTORY:  . Has two children. One to one and half packs a day from the age of 14 years to 38 years, quit eight years ago. Drinks alcohol three times a week. No current recreational drug use. No regular physical exercise.    PHYSICAL EXAM:    Vitals: /80  Pulse 60  Ht 1.803 m (5' 11\")  Wt 88.7 kg (195 lb 8 oz)  SpO2 100%  BMI 27.27 kg/m2    Constitutional: Comfortable at rest. Cooperative, alert and oriented, well developed, well nourished. Has multiple skin tattoos.  Eyes: Pupils equal and round, conjunctivae and lids unremarkable, sclera white, no xanthalasma,   ENT: Poor dental hygiene.  No cyanosis or pallor.  Neck: Carotid pulses are full and equal bilaterally, no carotid bruit, no thyromegaly     Respiratory: Normal respiratory effort with symmetrical chest wall movements and no use of accessory muscles. Good air " entry with normal breath sounds and no rales or wheeze.  Cardiovascular: Normal jugular venous pulse and pressure.  Normal carotid pulse character and volume.  No carotid bruit.  Apical impulse is undisplaced and normal to palpation without parasternal heave or thrill.  Heart sounds are normal and regular. No audible murmur. No S3, S4 or friction rub.  Right femoral arterial access site looks unremarkable, with normal pulse, no hematoma or bruising or bruit.  GI: Soft, nontender, no hepatosplenomegaly, no masses, active bowel sounds.    Skin: No rash, erythema, ecchymosis.  Musculoskeletal: Normal muscle tone and strength. Normal gait. No spinal deformities.  Neuropsychiatric: Oriented to time place and person.  Affect normal.  No gross motor deficits.  Extremities: No edema. No clubbing or deformities.    DIAGNOSTIC DATA:    LABORATORY   A total cholesterol 224, HDL 35, , triglycerides 318. Markedly elevated TSH of 37.14. No free T4. Hemoglobin 14 point six. Platelets 202. Sodium 142, potassium 3.9, BUN 17, creatinine 1.1 with an estimated GFR of 72.    I reviewed the images and results of recent exercise echocardiogram, ECG and coronary angiogram with the patient, as documented in HPI.    ECG:   Sinus bradycardia. At 57 BPM.      DIAGNOSES:  1. Coronary artery disease without angina, status post drug-eluting stent to the mid left anterior descending artery on 6/22/2017. Minimal residual coronary artery disease in other vessels.  2. Hyperlipidemia, not on statin therapy per patient preference.  3. Hypertriglyceridemia in the context of untreated hypothyroidism.  4. Hypothyroidism with a TSH of 37.14, not on replacement therapy per patient preference.  5. Family history of ischemic heart disease.  6. History of tobacco dependency (smoking cessation in 2011)    ASSESSMENT:  The patient had classical angina and a positive stress test with the finding of a single discrete, tight mid LAD lesion that was treated  with a drug-eluting stent, with complete resolution of symptoms. The only medications he is taking are dual antiplatelet therapy. He refuses statin despite discussing the advantages of it. He had myalgias in the past with pravastatin. I also talked to him about the PCSK-9 inhibitors, but he declines. He has untreated hypothyroidism. He does not want replacement therapy. The hypertriglyceridemia is probably secondary to this.    PLAN:  1. Repeat transthoracic echocardiogram since his initial test showed an LVEF of 40-45%.  2. Counseled about medications as above. I reiterated the importance of uninterrupted dual antiplatelet therapy for at least one year, followed by long-term low-dose aspirin for secondary prevention.  3. Advised to see his primary care provider about thyroid replacement therapy.  4. Declines phase 2 cardiac rehabilitation.   5. Follow up with cardiology YOANA in 4-6 weeks to discuss echocardiogram results.      Encounter Diagnoses   Name Primary?     Coronary artery disease involving native coronary artery of native heart without angina pectoris Yes     Presence of stent in LAD coronary artery      Hyperlipidemia LDL goal <70      Hypothyroidism, unspecified type      Coronary artery disease involving native coronary artery of native heart with unstable angina pectoris (H)        Orders Placed This Encounter   Procedures     Follow-Up with Cardiac Advanced Practice Provider     Echocardiogram       CC  REFERRING PROVIDER:  No referring provider defined for this encounter.

## 2017-07-31 NOTE — MR AVS SNAPSHOT
After Visit Summary   7/31/2017    Chase Blackwell    MRN: [id number]           Patient Information     Date Of Birth          1970        Visit Information        Provider Department      7/31/2017 1:30 PM Dedra Barlow MD [facility] [place]        Today's Diagnoses     Coronary artery disease involving native coronary artery of native heart without angina pectoris    -  1    Presence of stent in LAD coronary artery        Hyperlipidemia LDL goal <70        Hypothyroidism, unspecified type        Coronary artery disease involving native coronary artery of native heart with unstable angina pectoris (H)          Care Instructions    1. A heart ultrasound or transthoracic echocardiogram and follow-up with cardiology advance practice provider in 4-6 weeks.    2. I have renewed your prescription for BRILINTA, 3 months with 4 refills.    3. Do not stop the aspirin or BRILINTA, for at least one year. Otherwise there is a risk of YOUR stent getting blocked. After one year, continue aspirin for the rest of your life.    4. We talked about statin therapy. Not being started per your wishes.    5. Your thyroid test is very abnormal. This can affect your cholesterol levels. You need thyroid replacement therapy. Please see your primary provider regarding this.    If you have any questions or concerns, please call my nurse Linda Jordan at [phone].            Follow-ups after your visit        Additional Services     Follow-Up with Cardiac Advanced Practice Provider                 Future tests that were ordered for you today     Open Future Orders        Priority Expected Expires Ordered    Echocardiogram Routine 8/31/2017 7/31/2018 7/31/2017    Follow-Up with Cardiac Advanced Practice Provider Routine 9/4/2017 7/31/2018 7/31/2017            Who to contact     If you have questions or need follow up information about today's clinic visit or your schedule please  "contact [facility] [place] directly at [phone].  Normal or non-critical lab and imaging results will be communicated to you by MyChart, letter or phone within 4 business days after the clinic has received the results. If you do not hear from us within 7 days, please contact the clinic through MyChart or phone. If you have a critical or abnormal lab result, we will notify you by phone as soon as possible.  Submit refill requests through [employer] or call your pharmacy and they will forward the refill request to us. Please allow 3 business days for your refill to be completed.          Additional Information About Your Visit        [employer] Information     [facility] lets you send messages to your doctor, view your test results, renew your prescriptions, schedule appointments and more. To sign up, go to [email or web address] . Click on \"Log in\" on the left side of the screen, which will take you to the Welcome page. Then click on \"Sign up Now\" on the right side of the page.     You will be asked to enter the access code listed below, as well as some personal information. Please follow the directions to create your username and password.     Your access code is: [email or web address]  Expires: 2017 12:17 PM     Your access code will  in 90 days. If you need help or a new code, please call your [place] clinic or [phone].        Care EveryWhere ID     This is your Care EveryWhere ID. This could be used by other organizations to access your [place] medical records  [id number]        Your Vitals Were     Pulse Height Pulse Oximetry BMI (Body Mass Index)          60 1.803 m (5' 11\") 100% 27.27 kg/m2         Blood Pressure from Last 3 Encounters:   17 118/80   17 99/66   16 121/76    Weight from Last 3 Encounters:   17 88.7 kg (195 lb 8 oz)   17 85.9 kg (189 lb 4.8 oz)   16 89.8 kg (198 lb)               Primary Care Provider Office Phone # Fax " #    Live A MD Oren [phone]       [facility] [address] [facility] [address]        Equal Access to Services     SHIMONNAHOMY KELVIN : Hadii aad ku hadkristineangelica Cano, belenrosaura sharma, bernadette kasedrickda riaz, paulette diallonathaniel cartagenavega adkins azul hines. So [facility] [phone].    ATENCIÓN: Si habla español, tiene a sampson disposición servicios gratuitos de asistencia lingüística. Llame al [phone].    We comply with applicable federal civil rights laws and [place] laws. We do not discriminate on the basis of race, color, national origin, age, disability sex, sexual orientation or gender identity.            Thank you!     Thank you for choosing [facility] [place]  for your care. Our goal is always to provide you with excellent care. Hearing back from our patients is one way we can continue to improve our services. Please take a few minutes to complete the written survey that you may receive in the mail after your visit with us. Thank you!             Your Updated Medication List - Protect others around you: Learn how to safely use, store and throw away your medicines at www.disposemymeds.org.          This list is accurate as of: 7/31/17  2:18 PM.  Always use your most recent med list.                   Brand Name Dispense Instructions for use Diagnosis    aspirin 81 MG EC tablet     60 tablet    Take 1 tablet (81 mg) by mouth daily    Coronary artery disease involving native coronary artery of native heart with unstable angina pectoris (H)       nitroGLYcerin 0.4 MG sublingual tablet    NITROSTAT    25 tablet    For chest pain place 1 tablet under the tongue every 5 minutes for 3 doses. If symptoms persist 5 minutes after 1st dose call 911.    Coronary artery disease involving native coronary artery of native heart with unstable angina pectoris (H)       ticagrelor 90 MG tablet    BRILINTA    200 tablet    Take 1 tablet (90 mg) by mouth every 12 hours     Coronary artery disease involving native coronary artery of native heart with unstable angina pectoris (H)

## 2017-07-31 NOTE — LETTER
"7/31/2017    Lvie Lott MD  [facility]   [address] [facility] [address]    RE: Chase WANDER Blackwell       Dear Colleague,    I had the pleasure of seeing Chase WANDER Blackwell in the [facility].    REFERRING PROVIDER:  No referring provider defined for this encounter.    PRIMARY CARE PROVIDER:  Live Lott  [facility] [address] [facility] [address]    REASON FOR VISIT/CHIEF COMPLAINT:  1. Post hospital follow-up after receiving stent to the LAD for unstable angina.    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 He was hospitalized on 21 June, 2017 with the progressive unstable angina. His troponin was negative. ECG did not show any ischemic changes. However, he had a markedly positive stress test with the LVEF of 40-45%, large exercise-induced anteroseptal wall motion abnormality at low workload, with LV dilatation. He underwent an uncomplicated coronary angiogram via right femoral arterial access and was found to have a tight 95% mid LAD stenosis that was treated with a 3.5 x 18 mm drug-eluting stent with excellent angiographic result. There was minimal disease in the other arteries.    Patient has been asymptomatic since discharge. He is tolerating dual antiplatelet therapy with aspirin 81 mg and Ticagrelor 90 mg b.i.d. He is known to have significant hyperlipidemia with a total cholesterol of 224, HDL 35, , triglycerides 318. In the past, he had limiting myalgias with pravastatin and discontinued it a few  years ago. He does not want to restart statin therapy. He also has known hypothyroidism, and self discontinued his thyroid medications over a year ago. His recent TSH is very elevated at 37.14, but patient is not keen on taking any \"prescription medications\". He has not had any recurrence of chest pain. He went to one " "session of  phase 2 cardiac rehabilitation and elected not to continue with it. He is back at work full-time.    His risk factors include untreated hyperlipidemia, previous history of heavy smoking (one to one and half packs a day from the age of 14 years to 38 years, quit eight years ago), and a family history of heart disease (his maternal grandfather and paternal grandmother  suddenly from presumed myocardial infarction).    REVIEW OF SYSTEMS:  A comprehensive 10-point review of systems was completed and the pertinent positives are documented in the history of present illness.    CURRENT MEDICATIONS:  Aspirin 81 mg daily.  Ticagrelor (Brilinta) 90 mg times daily.  Sublingual nitroglycerin glycerin 0.4 milligrams, as needed.    PAST MEDICAL HISTORY:  1.  Thyroid disease, patient self discontinued thyroid replacement therapy.   2.  Hyperlipidemia.   3.  Gastroesophageal reflux disease.   4.  Coronary artery disease with troponin negative chest pain and a positive stress test leading to drug-eluting stenting of mid LAD. 2017.    PAST SURGICAL HISTORY:  Status post hernia repair surgery.    FAMILY HISTORY:  His mother  of cancer.  His father  of multiorgan failure; he is not sure exactly how. His maternal grandfather and paternal grandmother  suddenly from presumed myocardial infarction    SOCIAL HISTORY:  . Has two children. One to one and half packs a day from the age of 14 years to 38 years, quit eight years ago. Drinks alcohol three times a week. No current recreational drug use. No regular physical exercise.    PHYSICAL EXAM:    Vitals: /80  Pulse 60  Ht 1.803 m (5' 11\")  Wt 88.7 kg (195 lb 8 oz)  SpO2 100%  BMI 27.27 kg/m2    Constitutional: Comfortable at rest. Cooperative, alert and oriented, well developed, well nourished. Has multiple skin tattoos.  Eyes: Pupils equal and round, conjunctivae and lids unremarkable, sclera white, no xanthalasma,   ENT: Poor dental " hygiene.  No cyanosis or pallor.  Neck: Carotid pulses are full and equal bilaterally, no carotid bruit, no thyromegaly     Respiratory: Normal respiratory effort with symmetrical chest wall movements and no use of accessory muscles. Good air entry with normal breath sounds and no rales or wheeze.  Cardiovascular: Normal jugular venous pulse and pressure.  Normal carotid pulse character and volume.  No carotid bruit.  Apical impulse is undisplaced and normal to palpation without parasternal heave or thrill.  Heart sounds are normal and regular. No audible murmur. No S3, S4 or friction rub.  Right femoral arterial access site looks unremarkable, with normal pulse, no hematoma or bruising or bruit.  GI: Soft, nontender, no hepatosplenomegaly, no masses, active bowel sounds.    Skin: No rash, erythema, ecchymosis.  Musculoskeletal: Normal muscle tone and strength. Normal gait. No spinal deformities.  Neuropsychiatric: Oriented to time place and person.  Affect normal.  No gross motor deficits.  Extremities: No edema. No clubbing or deformities.    DIAGNOSTIC DATA:    LABORATORY   A total cholesterol 224, HDL 35, , triglycerides 318. Markedly elevated TSH of 37.14. No free T4. Hemoglobin 14 point six. Platelets 202. Sodium 142, potassium 3.9, BUN 17, creatinine 1.1 with an estimated GFR of 72.    I reviewed the images and results of recent exercise echocardiogram, ECG and coronary angiogram with the patient, as documented in HPI.    ECG:   Sinus bradycardia. At 57 BPM.      DIAGNOSES:  1. Coronary artery disease without angina, status post drug-eluting stent to the mid left anterior descending artery on 6/22/2017. Minimal residual coronary artery disease in other vessels.  2. Hyperlipidemia, not on statin therapy per patient preference.  3. Hypertriglyceridemia in the context of untreated hypothyroidism.  4. Hypothyroidism with a TSH of 37.14, not on replacement therapy per patient preference.  5. Family history  of ischemic heart disease.  6. History of tobacco dependency (smoking cessation in 2011)    ASSESSMENT:  The patient had classical angina and a positive stress test with the finding of a single discrete, tight mid LAD lesion that was treated with a drug-eluting stent, with complete resolution of symptoms. The only medications he is taking are dual antiplatelet therapy. He refuses statin despite discussing the advantages of it. He had myalgias in the past with pravastatin. I also talked to him about the PCSK-9 inhibitors, but he declines. He has untreated hypothyroidism. He does not want replacement therapy. The hypertriglyceridemia is probably secondary to this.    PLAN:  1. Repeat transthoracic echocardiogram since his initial test showed an LVEF of 40-45%.  2. Counseled about medications as above. I reiterated the importance of uninterrupted dual antiplatelet therapy for at least one year, followed by long-term low-dose aspirin for secondary prevention.  3. Advised to see his primary care provider about thyroid replacement therapy.  4. Declines phase 2 cardiac rehabilitation.   5. Follow up with cardiology YOANA in 4-6 weeks to discuss echocardiogram results.      Encounter Diagnoses   Name Primary?     Coronary artery disease involving native coronary artery of native heart without angina pectoris Yes     Presence of stent in LAD coronary artery      Hyperlipidemia LDL goal <70      Hypothyroidism, unspecified type      Coronary artery disease involving native coronary artery of native heart with unstable angina pectoris (H)        Orders Placed This Encounter   Procedures     Follow-Up with Cardiac Advanced Practice Provider     Echocardiogram     Thank you for allowing me to participate in the care of your patient.    Sincerely,     Dedar Barlow MD     [facility]

## 2017-07-31 NOTE — PATIENT INSTRUCTIONS
1. A heart ultrasound or transthoracic echocardiogram and follow-up with cardiology advance practice provider in 4-6 weeks.    2. I have renewed your prescription for BRILINTA, 3 months with 4 refills.    3. Do not stop the aspirin or BRILINTA, for at least one year. Otherwise there is a risk of YOUR stent getting blocked. After one year, continue aspirin for the rest of your life.    4. We talked about statin therapy. Not being started per your wishes.    5. Your thyroid test is very abnormal. This can affect your cholesterol levels. You need thyroid replacement therapy. Please see your primary provider regarding this.    If you have any questions or concerns, please call my nurse Linda Jordan at [phone].

## 2017-08-22 ENCOUNTER — CARE COORDINATION (OUTPATIENT)
Dept: CARDIOLOGY | Facility: CLINIC | Age: 47
End: 2017-08-22

## 2017-08-22 NOTE — PROGRESS NOTES
"Call from patient with complaint of \"feeling itchy\" without rash. He asks if could be related to brilllinta.  Hx CAD w/cath MICHELLE to midLAd 6/21/2017, has been on brillinta since without complaints until 2 days ago itching started.  Message to Dr Barlow to review.  Linda Nair RN 08/22/17 1:44 PM    500 pm Called to patient with Dr Barlow review and comments:  Patient has untreated hypothyroidism which could be causing painful, itching, sensitive skin, doubt is brillinta as he has been on for several months and complaints started 2 days ago, patient should see his primary MD to be evaluated and if unable to R/O a cause patient should make appointment with Dr Barlow to discuss possible switch from brillinta to plavix. Patient states understanding and agreement to plan.  Patient denies missed doses of brillinta and understanding of need to continue medication.  Linda Nair RN 08/22/17 5:05 PM         "

## 2017-09-01 ENCOUNTER — OFFICE VISIT (OUTPATIENT)
Dept: CARDIOLOGY | Facility: CLINIC | Age: 47
End: 2017-09-01
Attending: INTERNAL MEDICINE
Payer: COMMERCIAL

## 2017-09-01 ENCOUNTER — HOSPITAL ENCOUNTER (OUTPATIENT)
Dept: CARDIOLOGY | Facility: CLINIC | Age: 47
Discharge: HOME | End: 2017-09-01
Attending: INTERNAL MEDICINE | Admitting: INTERNAL MEDICINE
Payer: COMMERCIAL

## 2017-09-01 VITALS
WEIGHT: 197 LBS | BODY MASS INDEX: 27.58 KG/M2 | DIASTOLIC BLOOD PRESSURE: 78 MMHG | HEART RATE: 68 BPM | HEIGHT: 71 IN | SYSTOLIC BLOOD PRESSURE: 118 MMHG

## 2017-09-01 DIAGNOSIS — Z95.5 PRESENCE OF STENT IN LAD CORONARY ARTERY: ICD-10-CM

## 2017-09-01 DIAGNOSIS — I25.10 CORONARY ARTERY DISEASE INVOLVING NATIVE CORONARY ARTERY OF NATIVE HEART WITHOUT ANGINA PECTORIS: ICD-10-CM

## 2017-09-01 DIAGNOSIS — E78.5 HYPERLIPIDEMIA LDL GOAL <70: ICD-10-CM

## 2017-09-01 DIAGNOSIS — E03.9 HYPOTHYROIDISM, UNSPECIFIED TYPE: ICD-10-CM

## 2017-09-01 DIAGNOSIS — I77.810 DILATED AORTIC ROOT (H): Primary | ICD-10-CM

## 2017-09-01 PROCEDURE — 93306 TTE W/DOPPLER COMPLETE: CPT | Mod: 26 | Performed by: INTERNAL MEDICINE

## 2017-09-01 PROCEDURE — 99214 OFFICE O/P EST MOD 30 MIN: CPT | Mod: 25 | Performed by: NURSE PRACTITIONER

## 2017-09-01 PROCEDURE — 40000264 ECHO COMPLETE WITH LUMASON

## 2017-09-01 PROCEDURE — 25500064 ZZH RX 255 OP 636: Performed by: INTERNAL MEDICINE

## 2017-09-01 RX ADMIN — SULFUR HEXAFLUORIDE 2 ML: KIT at 10:00

## 2017-09-01 NOTE — MR AVS SNAPSHOT
After Visit Summary   9/1/2017    Chase Blackwell    MRN: [id number]           Patient Information     Date Of Birth          1970        Visit Information        Provider Department      9/1/2017 1:50 PM Le Gamino APRN CNP [facility]        Today's Diagnoses     Dilated aortic root (H)    -  1    Coronary artery disease involving native coronary artery of native heart without angina pectoris        Presence of stent in LAD coronary artery        Hyperlipidemia LDL goal <70           Follow-ups after your visit        Additional Services     Follow-Up with Cardiologist                 Future tests that were ordered for you today     Open Future Orders        Priority Expected Expires Ordered    Lipid Profile Routine 5/29/2018 9/1/2018 9/1/2017    ALT Routine 5/29/2018 9/1/2018 9/1/2017    Echocardiogram Routine 5/29/2018 9/1/2018 9/1/2017    Follow-Up with Cardiologist Routine 5/29/2018 9/1/2018 9/1/2017    Echo Complete with Lumason Routine 8/31/2017 7/31/2018 7/31/2017            Who to contact     If you have questions or need follow up information about today's clinic visit or your schedule please contact [facility] directly at [phone].  Normal or non-critical lab and imaging results will be communicated to you by [email or web address], letter or phone within 4 business days after the clinic has received the results. If you do not hear from us within 7 days, please contact the clinic through [email or web address] or phone. If you have a critical or abnormal lab result, we will notify you by phone as soon as possible.  Submit refill requests through [employer] or call your pharmacy and they will forward the refill request to us. Please allow 3 business days for your refill to be completed.          Additional Information About Your Visit        [email or web address] Information     [employer] lets you send messages to your doctor, view your  "test results, renew your prescriptions, schedule appointments and more. To sign up, go to [email or web address] . Click on \"Log in\" on the left side of the screen, which will take you to the Welcome page. Then click on \"Sign up Now\" on the right side of the page.     You will be asked to enter the access code listed below, as well as some personal information. Please follow the directions to create your username and password.     Your access code is: [email or web address]  Expires: 2017 12:17 PM     Your access code will  in 90 days. If you need help or a new code, please call your [facility] clinic or [phone].        Care EveryWhere ID     This is your Care EveryWhere ID. This could be used by other organizations to access your [facility] medical records  [id number]        Your Vitals Were     Pulse Height BMI (Body Mass Index)             68 1.803 m (5' 11\") 27.48 kg/m2          Blood Pressure from Last 3 Encounters:   17 118/78   17 118/80   17 99/66    Weight from Last 3 Encounters:   17 89.4 kg (197 lb)   17 88.7 kg (195 lb 8 oz)   17 85.9 kg (189 lb 4.8 oz)              We Performed the Following     Follow-Up with Cardiac Advanced Practice Provider        Primary Care Provider Office Phone # Fax #    [facility] [phone]       [address] [address]        Equal Access to Services     [facility] : Hadii koby huggins Somelvi, waaxda luqadaha, qaybta kaalmarosaura mello, paulette liang . So [facility] [phone].    ATENCIÓN: Si habla español, tiene a sampson disposición servicios gratuitos de asistencia lingüística. Llame al [phone].    We comply with applicable federal civil rights laws and [place] laws. We do not discriminate on the basis of race, color, national origin, age, disability sex, sexual orientation or gender identity.            Thank you!     Thank you for choosing " [facility] [place]  for your care. Our goal is always to provide you with excellent care. Hearing back from our patients is one way we can continue to improve our services. Please take a few minutes to complete the written survey that you may receive in the mail after your visit with us. Thank you!             Your Updated Medication List - Protect others around you: Learn how to safely use, store and throw away your medicines at www.disposemymeds.org.          This list is accurate as of: 9/1/17  2:19 PM.  Always use your most recent med list.                   Brand Name Dispense Instructions for use Diagnosis    aspirin 81 MG EC tablet     60 tablet    Take 1 tablet (81 mg) by mouth daily    Coronary artery disease involving native coronary artery of native heart with unstable angina pectoris (H)       nitroGLYcerin 0.4 MG sublingual tablet    NITROSTAT    25 tablet    For chest pain place 1 tablet under the tongue every 5 minutes for 3 doses. If symptoms persist 5 minutes after 1st dose call 911.    Coronary artery disease involving native coronary artery of native heart with unstable angina pectoris (H)       ticagrelor 90 MG tablet    BRILINTA    200 tablet    Take 1 tablet (90 mg) by mouth every 12 hours    Coronary artery disease involving native coronary artery of native heart with unstable angina pectoris (H)

## 2017-09-01 NOTE — PROGRESS NOTES
HPI and Plan:   See dictation  2:11 PM  [id number]    No orders of the defined types were placed in this encounter.      No orders of the defined types were placed in this encounter.      There are no discontinued medications.      Encounter Diagnoses   Name Primary?     Coronary artery disease involving native coronary artery of native heart without angina pectoris      Presence of stent in LAD coronary artery      Hyperlipidemia LDL goal <70      Hypothyroidism, unspecified type        CURRENT MEDICATIONS:  Current Outpatient Prescriptions   Medication Sig Dispense Refill     ticagrelor (BRILINTA) 90 MG tablet Take 1 tablet (90 mg) by mouth every 12 hours 200 tablet 4     aspirin EC 81 MG EC tablet Take 1 tablet (81 mg) by mouth daily 60 tablet 11     nitroglycerin (NITROSTAT) 0.4 MG sublingual tablet For chest pain place 1 tablet under the tongue every 5 minutes for 3 doses. If symptoms persist 5 minutes after 1st dose call 911. 70 tablet 1       ALLERGIES     Allergies   Allergen Reactions     Depakote [Valproic Acid] Anxiety     Makes pt feel anxious and tight in the upper chest       PAST MEDICAL HISTORY:  Past Medical History:   Diagnosis Date     Gastro-oesophageal reflux disease      Hyperlipidaemia      Thyroid disease        PAST SURGICAL HISTORY:  Past Surgical History:   Procedure Laterality Date     ANGIOGRAM  06/22/2017    MICHELLE to mLAD     HERNIA REPAIR       SOFT TISSUE SURGERY         FAMILY HISTORY:  Family History   Problem Relation Age of Onset     Hypertension Maternal Grandmother      Myocardial Infarction Maternal Grandfather      Myocardial Infarction Paternal Grandmother      Hypertension Brother        SOCIAL HISTORY:  Social History     Social History     Marital status:      Spouse name: N/A     Number of children: N/A     Years of education: N/A     Social History Main Topics     Smoking status: Former Smoker     Smokeless tobacco: Never Used     Alcohol use Yes      Comment: occ      "Drug use: No     Sexual activity: Not Asked     Other Topics Concern     None     Social History Narrative       Review of Systems:  Skin:  Positive for bruising     Eyes:  Positive for visual blurring    ENT:  Positive for epistaxis    Respiratory:  Positive for shortness of breath;dyspnea on exertion     Cardiovascular:  Negative      Gastroenterology: Positive for diarrhea    Genitourinary:  Positive for urinary frequency    Musculoskeletal:  Negative for      Neurologic:  Negative for      Psychiatric:  Negative for      Heme/Lymph/Imm:  Negative for      Endocrine:  Negative        Physical Exam:  Vitals: /78  Pulse 68  Ht 1.803 m (5' 11\")  Wt 89.4 kg (197 lb)  BMI 27.48 kg/m2    Constitutional:  cooperative, alert and oriented, well developed, well nourished, in no acute distress        Skin:  warm and dry to the touch, no apparent skin lesions or masses noted        Head:  normocephalic, no masses or lesions        Eyes:  pupils equal and round, conjunctivae and lids unremarkable, sclera white, no xanthalasma, EOMS intact, no nystagmus        ENT:  no pallor or cyanosis, dentition good        Neck:  carotid pulses are full and equal bilaterally;JVP normal;no carotid bruit        Chest:  clear to auscultation          Cardiac: regular rhythm, normal S1/S2, no S3 or S4, apical impulse not displaced, no murmurs, gallops or rubs                  Abdomen:  BS normoactive        Vascular: not assessed this visit                                        Extremities and Back:  no deformities, clubbing, cyanosis, erythema observed;no edema              Neurological:  affect appropriate, oriented to time, person and place;no gross motor deficits              [facility] Ilda Barlow MD  [address] [place] [id number]              "

## 2017-09-01 NOTE — PROGRESS NOTES
CHIEF COMPLAINT:  Followup echo.      HISTORY OF PRESENT ILLNESS:  Mr. Blackwell is a pleasant 47-year-old patient who follows with Dr. Barlow.  He has a history of coronary artery disease.  He was hospitalized in June of this year with progressive unstable angina.  He had a markedly positive stress test with an ejection fraction of 40%-45% and a large exercise-induced anterior septal wall motion abnormality at low workload, as well as LV dilatation.  He went on to have a coronary angiogram and was found to have a tight 95% mid left anterior descending artery stenosis that was treated with a drug-eluting stent.  There was minimal disease elsewhere.      He saw Dr. Barlow in followup on 07/31.  At that time it was noted he was not taking a statin medicine as he has declined to take this.  Additionally, his TSH was quite elevated at 37.14 and at the time had been declining to take any medication for hypothyroid.  He had gone back for cardiac rehab phase 2 only one time and elected not to continue this.      He was asked to follow up with an echocardiogram and then an office visit.  His echo was completed earlier today.  Results identified an ejection fraction of 50% -55%.  There was borderline global hypokinesia of the left ventricle.  No regional wall motion abnormality was identified.  The right ventricle was normal in size and systolic function.  There was no mitral regurgitation noted.  There was mild aortic root dilatation measuring 4.1 cm.      In talking with Mr. Blackwell, he tells me he feels well.  He has no complaints of chest pain, no specific shortness of breath.  No orthopnea or paroxysmal nocturnal dyspnea.  He tells me sometimes after activity, it will take him a minute or so to catch his breath but nothing that seems completely out of the ordinary.  He tells me he started trying the thyroid medication over the course of the last week as he was having some complaints of burning and itching skin which he thought  maybe was related to hypothyroid.  Beyond that he continues to decline use of statin therapy.      PHYSICAL EXAMINATION:   GENERAL:  On exam, this is a well-developed, well-nourished male, who appears his stated age.  He is cooperative and appropriate.   VITAL SIGNS:  Stable.   NEUROLOGIC:  Intact.   HEENT:  Normocephalic, atraumatic without tenderness or involuntary movements.  Face appears symmetric.  Visual fields are full.  EOMs intact.  Conjunctivae clear.  Oral mucosa is pink and moist.   NECK:  Supple, full range of motion, trachea appears midline, no JVD, no carotid bruit.   CARDIOVASCULAR:  S1, S2, regular rate and rhythm.   CHEST:  Chest expansion appears symmetric.  Breath sounds are clear.   ABDOMEN:  Soft.  Bowel sounds present.   EXTREMITIES:  Warm and dry without edema, cyanosis or clubbing.      IMPRESSION AND PLAN:   1.  Coronary artery disease, status post stent placement to the left anterior descending artery.  Mr. Blackwell remains on aspirin and Brilinta.  He knows he needs to be on Brilinta for at least 1 year from the time of his intervention.  He will continue on aspirin lifelong.  He has declined statin.   2.  Mild LV systolic dysfunction.  Echocardiogram shows an ejection fraction of 50%-55%, which is a little bit better than it was prior to the stenting procedure.  We will continue surveillance in the future.   3.  History of dyslipidemia.  In June of this year, total cholesterol 224, HDL 35, , triglycerides 318.  Once again, he has declined the use of statin therapy.   4.  Borderline dilated aortic root measuring 4.1 cm.  We will continue with future surveillance and echocardiography at his next visit with Dr. Barlow.  I asked Mr. Blackwell to visit with Dr. Barlow again in June of next year.  He is aware if there are questions or concerns prior to then, he can contact the office.      Thank you for allowing me to participate in the care of this patient.         FORTINO HART, REN CNP              D: 2017 14:17   T: 2017 14:42   MT: al      Name:     BERNIE MOSS   MRN:      [id number]        Account:      [id number]   :      1970           Service Date: 2017      Document: [id number]

## 2023-06-29 ENCOUNTER — HOSPITAL ENCOUNTER (OUTPATIENT)
Facility: CLINIC | Age: 53
Setting detail: OBSERVATION
Discharge: LEFT BEFORE DISCHARGE | End: 2023-06-30
Attending: EMERGENCY MEDICINE | Admitting: HOSPITALIST
Payer: COMMERCIAL

## 2023-06-29 ENCOUNTER — APPOINTMENT (OUTPATIENT)
Dept: CARDIOLOGY | Facility: CLINIC | Age: 53
End: 2023-06-29
Attending: NURSE PRACTITIONER
Payer: COMMERCIAL

## 2023-06-29 ENCOUNTER — APPOINTMENT (OUTPATIENT)
Dept: GENERAL RADIOLOGY | Facility: CLINIC | Age: 53
End: 2023-06-29
Attending: EMERGENCY MEDICINE
Payer: COMMERCIAL

## 2023-06-29 DIAGNOSIS — I20.0 UNSTABLE ANGINA (H): ICD-10-CM

## 2023-06-29 DIAGNOSIS — I24.9 ACUTE CORONARY SYNDROME (H): Primary | ICD-10-CM

## 2023-06-29 LAB
ANION GAP SERPL CALCULATED.3IONS-SCNC: 13 MMOL/L (ref 7–15)
ATRIAL RATE - MUSE: 74 BPM
BASOPHILS # BLD AUTO: 0.1 10E3/UL (ref 0–0.2)
BASOPHILS NFR BLD AUTO: 1 %
BUN SERPL-MCNC: 14 MG/DL (ref 6–20)
CALCIUM SERPL-MCNC: 9.8 MG/DL (ref 8.6–10)
CHLORIDE SERPL-SCNC: 102 MMOL/L (ref 98–107)
CREAT SERPL-MCNC: 1.18 MG/DL (ref 0.67–1.17)
DEPRECATED HCO3 PLAS-SCNC: 25 MMOL/L (ref 22–29)
DIASTOLIC BLOOD PRESSURE - MUSE: NORMAL MMHG
EOSINOPHIL # BLD AUTO: 0.1 10E3/UL (ref 0–0.7)
EOSINOPHIL NFR BLD AUTO: 2 %
ERYTHROCYTE [DISTWIDTH] IN BLOOD BY AUTOMATED COUNT: 13.2 % (ref 10–15)
GFR SERPL CREATININE-BSD FRML MDRD: 75 ML/MIN/1.73M2
GLUCOSE SERPL-MCNC: 86 MG/DL (ref 70–99)
HCT VFR BLD AUTO: 46.3 % (ref 40–53)
HGB BLD-MCNC: 15.1 G/DL (ref 13.3–17.7)
HOLD SPECIMEN: NORMAL
HOLD SPECIMEN: NORMAL
IMM GRANULOCYTES # BLD: 0 10E3/UL
IMM GRANULOCYTES NFR BLD: 0 %
INTERPRETATION ECG - MUSE: NORMAL
LVEF ECHO: NORMAL
LYMPHOCYTES # BLD AUTO: 1.8 10E3/UL (ref 0.8–5.3)
LYMPHOCYTES NFR BLD AUTO: 35 %
MCH RBC QN AUTO: 29.7 PG (ref 26.5–33)
MCHC RBC AUTO-ENTMCNC: 32.6 G/DL (ref 31.5–36.5)
MCV RBC AUTO: 91 FL (ref 78–100)
MONOCYTES # BLD AUTO: 0.5 10E3/UL (ref 0–1.3)
MONOCYTES NFR BLD AUTO: 10 %
NEUTROPHILS # BLD AUTO: 2.7 10E3/UL (ref 1.6–8.3)
NEUTROPHILS NFR BLD AUTO: 52 %
NRBC # BLD AUTO: 0 10E3/UL
NRBC BLD AUTO-RTO: 0 /100
P AXIS - MUSE: 41 DEGREES
PLATELET # BLD AUTO: 288 10E3/UL (ref 150–450)
POTASSIUM SERPL-SCNC: 4.2 MMOL/L (ref 3.4–5.3)
PR INTERVAL - MUSE: 178 MS
QRS DURATION - MUSE: 92 MS
QT - MUSE: 376 MS
QTC - MUSE: 417 MS
R AXIS - MUSE: 9 DEGREES
RBC # BLD AUTO: 5.08 10E6/UL (ref 4.4–5.9)
SODIUM SERPL-SCNC: 140 MMOL/L (ref 136–145)
SYSTOLIC BLOOD PRESSURE - MUSE: NORMAL MMHG
T AXIS - MUSE: 25 DEGREES
TROPONIN T SERPL HS-MCNC: <6 NG/L
TSH SERPL DL<=0.005 MIU/L-ACNC: 12.87 UIU/ML (ref 0.3–4.2)
VENTRICULAR RATE- MUSE: 74 BPM
WBC # BLD AUTO: 5.3 10E3/UL (ref 4–11)

## 2023-06-29 PROCEDURE — 999N000208 ECHOCARDIOGRAM COMPLETE

## 2023-06-29 PROCEDURE — 71046 X-RAY EXAM CHEST 2 VIEWS: CPT

## 2023-06-29 PROCEDURE — 84484 ASSAY OF TROPONIN QUANT: CPT | Performed by: NURSE PRACTITIONER

## 2023-06-29 PROCEDURE — 99204 OFFICE O/P NEW MOD 45 MIN: CPT | Mod: 25 | Performed by: INTERNAL MEDICINE

## 2023-06-29 PROCEDURE — 36415 COLL VENOUS BLD VENIPUNCTURE: CPT | Performed by: NURSE PRACTITIONER

## 2023-06-29 PROCEDURE — 85025 COMPLETE CBC W/AUTO DIFF WBC: CPT | Performed by: EMERGENCY MEDICINE

## 2023-06-29 PROCEDURE — 99285 EMERGENCY DEPT VISIT HI MDM: CPT | Mod: 25

## 2023-06-29 PROCEDURE — 84484 ASSAY OF TROPONIN QUANT: CPT | Performed by: EMERGENCY MEDICINE

## 2023-06-29 PROCEDURE — G0378 HOSPITAL OBSERVATION PER HR: HCPCS

## 2023-06-29 PROCEDURE — 36415 COLL VENOUS BLD VENIPUNCTURE: CPT | Performed by: EMERGENCY MEDICINE

## 2023-06-29 PROCEDURE — 80061 LIPID PANEL: CPT | Performed by: NURSE PRACTITIONER

## 2023-06-29 PROCEDURE — 93005 ELECTROCARDIOGRAM TRACING: CPT

## 2023-06-29 PROCEDURE — 255N000002 HC RX 255 OP 636: Performed by: HOSPITALIST

## 2023-06-29 PROCEDURE — 80048 BASIC METABOLIC PNL TOTAL CA: CPT | Performed by: EMERGENCY MEDICINE

## 2023-06-29 PROCEDURE — 99223 1ST HOSP IP/OBS HIGH 75: CPT | Mod: AI | Performed by: NURSE PRACTITIONER

## 2023-06-29 PROCEDURE — 93306 TTE W/DOPPLER COMPLETE: CPT | Mod: 26 | Performed by: INTERNAL MEDICINE

## 2023-06-29 PROCEDURE — 250N000013 HC RX MED GY IP 250 OP 250 PS 637: Performed by: EMERGENCY MEDICINE

## 2023-06-29 PROCEDURE — 84443 ASSAY THYROID STIM HORMONE: CPT | Performed by: NURSE PRACTITIONER

## 2023-06-29 RX ORDER — ONDANSETRON 4 MG/1
4 TABLET, ORALLY DISINTEGRATING ORAL EVERY 6 HOURS PRN
Status: DISCONTINUED | OUTPATIENT
Start: 2023-06-29 | End: 2023-06-30 | Stop reason: HOSPADM

## 2023-06-29 RX ORDER — NALOXONE HYDROCHLORIDE 0.4 MG/ML
0.4 INJECTION, SOLUTION INTRAMUSCULAR; INTRAVENOUS; SUBCUTANEOUS
Status: DISCONTINUED | OUTPATIENT
Start: 2023-06-29 | End: 2023-06-30 | Stop reason: HOSPADM

## 2023-06-29 RX ORDER — ACETAMINOPHEN 650 MG/1
650 SUPPOSITORY RECTAL EVERY 6 HOURS PRN
Status: DISCONTINUED | OUTPATIENT
Start: 2023-06-29 | End: 2023-06-30 | Stop reason: HOSPADM

## 2023-06-29 RX ORDER — ASPIRIN 325 MG
325 TABLET ORAL ONCE
Status: CANCELLED | OUTPATIENT
Start: 2023-06-29 | End: 2023-06-29

## 2023-06-29 RX ORDER — LORAZEPAM 0.5 MG/1
0.5 TABLET ORAL
Status: CANCELLED | OUTPATIENT
Start: 2023-06-29

## 2023-06-29 RX ORDER — POTASSIUM CHLORIDE 1500 MG/1
20 TABLET, EXTENDED RELEASE ORAL
Status: CANCELLED | OUTPATIENT
Start: 2023-06-29

## 2023-06-29 RX ORDER — ASPIRIN 325 MG
325 TABLET ORAL ONCE
Status: COMPLETED | OUTPATIENT
Start: 2023-06-29 | End: 2023-06-29

## 2023-06-29 RX ORDER — ASPIRIN 81 MG/1
81 TABLET, CHEWABLE ORAL DAILY
Status: DISCONTINUED | OUTPATIENT
Start: 2023-06-30 | End: 2023-06-30 | Stop reason: HOSPADM

## 2023-06-29 RX ORDER — MORPHINE SULFATE 2 MG/ML
1 INJECTION, SOLUTION INTRAMUSCULAR; INTRAVENOUS
Status: DISCONTINUED | OUTPATIENT
Start: 2023-06-29 | End: 2023-06-30 | Stop reason: HOSPADM

## 2023-06-29 RX ORDER — ACETAMINOPHEN 325 MG/1
650 TABLET ORAL EVERY 6 HOURS PRN
Status: DISCONTINUED | OUTPATIENT
Start: 2023-06-29 | End: 2023-06-30 | Stop reason: HOSPADM

## 2023-06-29 RX ORDER — AMOXICILLIN 250 MG
1 CAPSULE ORAL 2 TIMES DAILY PRN
Status: DISCONTINUED | OUTPATIENT
Start: 2023-06-29 | End: 2023-06-30 | Stop reason: HOSPADM

## 2023-06-29 RX ORDER — SODIUM CHLORIDE 9 MG/ML
INJECTION, SOLUTION INTRAVENOUS CONTINUOUS
Status: CANCELLED | OUTPATIENT
Start: 2023-06-29

## 2023-06-29 RX ORDER — NALOXONE HYDROCHLORIDE 0.4 MG/ML
0.2 INJECTION, SOLUTION INTRAMUSCULAR; INTRAVENOUS; SUBCUTANEOUS
Status: DISCONTINUED | OUTPATIENT
Start: 2023-06-29 | End: 2023-06-30 | Stop reason: HOSPADM

## 2023-06-29 RX ORDER — ONDANSETRON 2 MG/ML
4 INJECTION INTRAMUSCULAR; INTRAVENOUS EVERY 6 HOURS PRN
Status: DISCONTINUED | OUTPATIENT
Start: 2023-06-29 | End: 2023-06-30 | Stop reason: HOSPADM

## 2023-06-29 RX ORDER — LIDOCAINE 40 MG/G
CREAM TOPICAL
Status: CANCELLED | OUTPATIENT
Start: 2023-06-29

## 2023-06-29 RX ORDER — LORAZEPAM 2 MG/ML
0.5 INJECTION INTRAMUSCULAR
Status: CANCELLED | OUTPATIENT
Start: 2023-06-29

## 2023-06-29 RX ORDER — ASPIRIN 81 MG/1
243 TABLET, CHEWABLE ORAL ONCE
Status: CANCELLED | OUTPATIENT
Start: 2023-06-29

## 2023-06-29 RX ORDER — LIDOCAINE 40 MG/G
CREAM TOPICAL
Status: DISCONTINUED | OUTPATIENT
Start: 2023-06-29 | End: 2023-06-30 | Stop reason: HOSPADM

## 2023-06-29 RX ORDER — MORPHINE SULFATE 2 MG/ML
2 INJECTION, SOLUTION INTRAMUSCULAR; INTRAVENOUS
Status: DISCONTINUED | OUTPATIENT
Start: 2023-06-29 | End: 2023-06-30 | Stop reason: HOSPADM

## 2023-06-29 RX ORDER — AMOXICILLIN 250 MG
2 CAPSULE ORAL 2 TIMES DAILY PRN
Status: DISCONTINUED | OUTPATIENT
Start: 2023-06-29 | End: 2023-06-30 | Stop reason: HOSPADM

## 2023-06-29 RX ADMIN — HUMAN ALBUMIN MICROSPHERES AND PERFLUTREN 9 ML: 10; .22 INJECTION, SOLUTION INTRAVENOUS at 13:50

## 2023-06-29 RX ADMIN — ASPIRIN 325 MG ORAL TABLET 325 MG: 325 PILL ORAL at 14:02

## 2023-06-29 ASSESSMENT — ACTIVITIES OF DAILY LIVING (ADL)
ADLS_ACUITY_SCORE: 33
ADLS_ACUITY_SCORE: 35
ADLS_ACUITY_SCORE: 33
ADLS_ACUITY_SCORE: 35
ADLS_ACUITY_SCORE: 33

## 2023-06-29 NOTE — ED TRIAGE NOTES
C/o of mid sternal chest pain on and off for the last couple of days. Pt states he has stents placed in  2017.

## 2023-06-29 NOTE — PROGRESS NOTES
Observation goals  PRIOR TO DISCHARGE        Comments: -diagnostic tests and consults completed and resulted: Not met  -vital signs normal or at patient baseline: Met   -tolerating oral intake to maintain hydration: Met   -adequate pain control on oral analgesics: Met   -returns to baseline functional status: Met   -safe disposition plan has been identified: Met   Nurse to notify provider when observation goals have been met and patient is ready for discharge.

## 2023-06-29 NOTE — PHARMACY-ADMISSION MEDICATION HISTORY
Pharmacist Admission Medication History    Admission medication history is complete. The information provided in this note is only as accurate as the sources available at the time of the update.    Medication reconciliation/reorder completed by provider prior to medication history? No    Information Source(s): Patient and CareEverywhere/SureScripts via in-person    Pertinent Information: Patient is a reliable historian. Thinks nitroglycerin supply is  at home (no fill history in the last 12 months).     Changes made to PTA medication list:    Added: None    Deleted: Brilinta 90 mg BID (added to PTA med list , no fill history last 12 months, patient verifies no longer taking)     Changed: None    Medication Affordability:  Not including over the counter (OTC) medications, was there a time in the past 3 months when you did not take your medications as prescribed because of cost?: No    Allergies reviewed with patient and updates made in EHR: yes    Medication History Completed By: Keesha Cristina [facility] 2023 12:56 PM    Medication Sig Last Dose Taking? Auth Provider   aspirin EC 81 MG EC tablet Take 1 tablet (81 mg) by mouth daily 2023 at AM Yes Ayesha Hernandez MD, MD   nitroglycerin (NITROSTAT) 0.4 MG sublingual tablet For chest pain place 1 tablet under the tongue every 5 minutes for 3 doses. If symptoms persist 5 minutes after 1st dose call 911. 2023 at PRN, unknown time Yes Ayesha Hernandez MD, MD

## 2023-06-29 NOTE — CONSULTS
[facility]    Cardiology Consultation     Date of Admission:  6/29/2023    Assessment & Plan   Chase Blackwell is a 52 year old male who was admitted on 6/29/2023.    1.  Unstable angina.  He is having episodes of chest tightness consistent with his previous angina now occurring with minimal exertion.    2.  Coronary artery disease with history of LAD stenting in 2017.    3.  Severe dyslipidemia with .  He developed myalgias while taking pravastatin and does not wish to consider taking statins again.  He has not been treating his high cholesterol for several years.    Recommendations:    We talked about the options of considering stress testing versus directly going to coronary angiography.  He would prefer to do coronary angiography.  Risks and benefits of left heart catheterization and coronary angiogram were discussed with the patient in detail. Risk estimated at 0.1-0.3% for diagnostic angio and 1-2% for PCI, including risk of stroke, MI, death, emergent bypass, contrast induced allergic reaction, renal dysfunction, and vascular complications (including bleeding and transfusion) were discussed. Patient understands and wishes to proceed.    High complexity     YAJAIRA POTTER MD, MD    Primary Care Physician   Live Lott MD    Reason for Consult   Reason for consult: I was asked by the hospitalist service to evaluate this patient for chest pain.    History of Present Illness   Chase Blackwell is a 52 year old male who presents with exertional chest tightness symptoms beginning 3 days ago.  Over the course of the last 3 days, his chest tightness has occurred with less exertion.  Now he is developing chest tightness with just walking across the room.  Discomfort resolves quickly within just a few minutes of sitting down to rest.  He describes this as a central retrosternal chest tightness with a more sharp component radiating through to his back.  He also has a little bit of  right posterior neck pain but is unsure if this correlates with the chest discomfort.  He feels more short of breath with activity and has the feeling of being dizzy or unbalanced at times.  He has not had diaphoresis or nausea.    He has not been taking his statin because of myalgias.  He also not been taking his aspirin consistently because he often forgets.    His cardiac risk factors include severe dyslipidemia with LDL up to 211 in October 2022..  He does not have hypertension or diabetes.  He quit smoking 15 years ago.  He does not have a family history of premature coronary artery disease in any first-degree relative.    Past Medical History   Past Medical History:   Diagnosis Date     Gastro-oesophageal reflux disease      Hyperlipidaemia      Thyroid disease          Past Surgical History   Past Surgical History:   Procedure Laterality Date     ANGIOGRAM  06/22/2017    MICHELLE to mLAD     HERNIA REPAIR       SOFT TISSUE SURGERY           Prior to Admission Medications   Prior to Admission Medications   Prescriptions Last Dose Informant Patient Reported? Taking?   aspirin EC 81 MG EC tablet 6/28/2023 at AM Self No Yes   Sig: Take 1 tablet (81 mg) by mouth daily   nitroglycerin (NITROSTAT) 0.4 MG sublingual tablet 6/27/2023 at PRN, unknown time Self No Yes   Sig: For chest pain place 1 tablet under the tongue every 5 minutes for 3 doses. If symptoms persist 5 minutes after 1st dose call 911.      Facility-Administered Medications: None     Current Facility-Administered Medications   Medication Dose Route Frequency     [START ON 6/30/2023] aspirin  81 mg Oral Daily     sodium chloride (PF)  3 mL Intracatheter Q8H     Current Facility-Administered Medications   Medication Last Rate     Allergies   Allergies   Allergen Reactions     Levofloxacin Muscle Pain (Myalgia)     Added per Care Everywhere, patient unsure of allergy 6/29/23     Pravastatin Muscle Pain (Myalgia)     Pt refuses all statins     Zolmitriptan       "Chest pain, per Care Everywhere and patient 6/29/23     Depakote [Valproic Acid] Anxiety     Makes pt feel anxious and tight in the upper chest       Social History    reports that he has quit smoking. He has never used smokeless tobacco. He reports current alcohol use. He reports that he does not use drugs.      Family History   I have reviewed this patient's family history and updated it with pertinent information if needed.  Family History   Problem Relation Age of Onset     Hypertension Maternal Grandmother      Myocardial Infarction Maternal Grandfather      Myocardial Infarction Paternal Grandmother      Hypertension Brother           Review of Systems   A comprehensive review of system was performed and is negative other than that noted in the HPI or here.     Physical Exam   Vital Signs with Ranges  Temp:  [97.8  F (36.6  C)-98.2  F (36.8  C)] 97.8  F (36.6  C)  Pulse:  [58-70] 58  Resp:  [13-18] 18  BP: (129-147)/() 132/82  SpO2:  [94 %-100 %] 97 %  Wt Readings from Last 4 Encounters:   06/29/23 87 kg (191 lb 11.2 oz)   09/01/17 89.4 kg (197 lb)   07/31/17 88.7 kg (195 lb 8 oz)   06/22/17 85.9 kg (189 lb 4.8 oz)     No intake/output data recorded.      Vitals: /82 (BP Location: Right arm, Patient Position: Semi-Coelho's, Cuff Size: Adult Regular)   Pulse 58   Temp 97.8  F (36.6  C) (Oral)   Resp 18   Ht 1.803 m (5' 11\")   Wt 87 kg (191 lb 11.2 oz)   SpO2 97%   BMI 26.74 kg/m      Physical Exam:   General - Alert and oriented to time place and person in no acute distress  Eyes - No scleral icterus  HEENT - Neck supple, moist mucous membranes  Cardiovascular -regular rate and rhythm without murmur.  No carotid bruits.  Extremities - There is no lower extremity edema  Respiratory -clear to auscultation  Skin - No pallor or cyanosis  Gastrointestinal - Non tender and non distended without rebound or guarding  Psych - Appropriate affect   Neurological - No gross motor neurological focal " deficits    No lab results found in last 7 days.    Invalid input(s): TROPONINIES    Recent Labs   Lab 23  1045   WBC 5.3   HGB 15.1   MCV 91         POTASSIUM 4.2   CHLORIDE 102   CO2 25   BUN 14.0   CR 1.18*   GFRESTIMATED 75   ANIONGAP 13   TOY 9.8   GLC 86     Recent Labs   Lab Test 17  0540   CHOL 224*   HDL 35*   *   TRIG 318*     Recent Labs   Lab 23  1045   WBC 5.3   HGB 15.1   HCT 46.3   MCV 91        No results for input(s): PH, PHV, PO2, PO2V, SAT, PCO2, PCO2V, HCO3, HCO3V in the last 168 hours.  No results for input(s): NTBNPI, NTBNP in the last 168 hours.  No results for input(s): DD in the last 168 hours.  No results for input(s): SED, CRP in the last 168 hours.  Recent Labs   Lab 23  1045        Recent Labs   Lab 23  1403   TSH 12.87*     No results for input(s): COLOR, APPEARANCE, URINEGLC, URINEBILI, URINEKETONE, SG, UBLD, URINEPH, PROTEIN, UROBILINOGEN, NITRITE, LEUKEST, RBCU, WBCU in the last 168 hours.    Imaging:  Recent Results (from the past 48 hour(s))   Chest XR,  PA & LAT    Narrative    XR CHEST 2 VIEWS   2023 1:15 PM     HISTORY: chest pain    COMPARISON: Chest radiograph 2017      Impression    IMPRESSION: No acute cardiopulmonary disease.    CINTHIA FRASER MD         SYSTEM ID:  [id number]   Echocardiogram Complete   Result Value    LVEF  55-60%    Narrative    [id number]  [id number]  [id number]  [id number]^ADRIANO^JOHANA     [facility]  Echocardiography Laboratory  [address] [place] [address]     Name: BERNIE MOSS  MRN: [id number]  : 1970  Study Date: 2023 01:33 PM  Age: 52 yrs  Gender: Male  Patient Location: [facility]  Reason For Study: Syncope  Ordering Physician: JOHANA OH  Referring Physician: Live Lott  Performed By: Nadine Dalton     BSA: 2.1 m2  Height: 71 in  Weight: 190 lb  HR: 60  BP: 131/84  mmHg  ______________________________________________________________________________  Procedure  Complete Portable Echo Adult. Optison (NDC #[id number]) given intravenously.  ______________________________________________________________________________  Interpretation Summary     The left ventricle is normal in size.  There is normal left ventricular wall thickness.  The visual ejection fraction is 55-60%.  Left ventricular diastolic function is normal.  No regional wall motion abnormalities noted.  No hemodynamically significant valvular heart disease.  ______________________________________________________________________________  Left Ventricle  The left ventricle is normal in size. There is normal left ventricular wall  thickness. The visual ejection fraction is 55-60%. Left ventricular diastolic  function is normal. No regional wall motion abnormalities noted.     Right Ventricle  The right ventricle is normal in size and function.     Atria  Normal left atrial size. Right atrial size is normal. There is no color  Doppler evidence of an atrial shunt.     Mitral Valve  The mitral valve is normal in structure and function. There is trace mitral  regurgitation.     Tricuspid Valve  Right ventricle systolic pressure estimate normal. There is trace tricuspid  regurgitation. The right ventricular systolic pressure is approximated at 18.5  mmHg plus the right atrial pressure. IVC diameter >2.1 cm collapsing <50% with  sniff suggests a high RA pressure estimated at 15 mmHg or greater.     Aortic Valve  Normal tricuspid aortic valve. No aortic regurgitation is present.     Pulmonic Valve  There is no pulmonic valvular regurgitation.     Vessels  Normal size aorta. Borderline aortic root dilatation.     Pericardium  There is no pericardial effusion.     ______________________________________________________________________________  MMode/2D Measurements & Calculations  IVSd: 0.88 cm  LVIDd: 4.9 cm  LVIDs: 3.6 cm  LVPWd:  "0.84 cm  FS: 27.0 %  LV mass(C)d: 143.2 grams  LV mass(C)dI: 69.4 grams/m2     Ao root diam: 4.0 cm  LA dimension: 3.2 cm  asc Aorta Diam: 3.4 cm  LA/Ao: 0.80  LVOT diam: 2.4 cm  LVOT area: 4.5 cm2  LA Volume (BP): 31.3 ml  LA Volume Index (BP): 15.2 ml/m2  RWT: 0.34  TAPSE: 2.4 cm     Doppler Measurements & Calculations  MV E max yoshi: 64.3 cm/sec  MV A max yoshi: 81.4 cm/sec  MV E/A: 0.79     MV dec time: 0.20 sec  Ao V2 max: 101.0 cm/sec  Ao max P.0 mmHg  Ao V2 mean: 68.5 cm/sec  Ao mean P.0 mmHg  Ao V2 VTI: 21.7 cm  NEGAR(I,D): 3.8 cm2  NEGAR(V,D): 4.1 cm2  LV V1 max PG: 3.4 mmHg  LV V1 max: 92.5 cm/sec  LV V1 VTI: 18.2 cm  SV(LVOT): 82.3 ml  SI(LVOT): 39.9 ml/m2  PA acc time: 0.14 sec  TR max yoshi: 215.0 cm/sec  TR max P.5 mmHg  AV Yoshi Ratio (DI): 0.92  NEGAR Index (cm2/m2): 1.8  E/E' av.0  Lateral E/e': 4.9  Medial E/e': 7.1  RV S Yoshi: 11.7 cm/sec     ______________________________________________________________________________  Report approved by: Raffi Lofton 2023 04:10 PM             Echo:  No results found for this or any previous visit (from the past 4320 hour(s)).    Clinically Significant Risk Factors Present on Admission                # Drug Induced Platelet Defect: home medication list includes an antiplatelet medication        # Overweight: Estimated body mass index is 26.74 kg/m  as calculated from the following:    Height as of this encounter: 1.803 m (5' 11\").    Weight as of this encounter: 87 kg (191 lb 11.2 oz).           Not present on admission                                                   "

## 2023-06-29 NOTE — PLAN OF CARE
Orientation/Cognitive: AOx4  Observation Goals (Met/ Not Met): Not met  Mobility Level/Assist Equipment: Ind  Fall Risk (Y/N): N  Behavior Concerns: None  Pain Management: Denies pain at this time. PRN morphine 1-2mg IV available.  Tele/VS/O2: VSS. Tele NSR  ABNL Lab/BG: Creatinine 1.18, TSH 12.87  Diet: Regular   Bowel/Bladder: Continent   Skin Concerns: None noted  Drains/Devices: PIV on right arm SL.  Tests/Procedures for next shift: Cardiology following patient.  Anticipated DC date & active delays: 6/30/23  Patient Stated Goal for Today: Rest.

## 2023-06-29 NOTE — ED PROVIDER NOTES
"  History     Chief Complaint:  Chest Pain       HPI   Chase Blackwell is a 52 year old male on Brilinta with history of coronary artery stent insertion in 2017 who presents with three days of intermittent chest pain that worsened today. Patient states that his pain is exertional in nature and is not present at rest. The pain shoots through to his back as well. He adds that he has had similar symptoms in the past at the time of his stent placement, though he only had chest tightness at this time and did not have any associated pain. He now reports having chest tightness and chest pain. He has not had any chest pain since the stent placement until his symptoms began three days ago.     Independent Historian:   None - Patient Only    Review of External Notes:         Medications:    Aspirin 81 mg  Nitrostat  Brilinta     Past Medical History:    GERD  Hyperlipidemia  Hypothyroidism   Acute coronary syndrome  Unstable angina   COVID-19 infection    Past Surgical History:  Angiogram with stent insertion   Hernia repair  Soft tissue surgery   Vasectomy  Tonsillectomy & adenoidectomy     Physical Exam     Patient Vitals for the past 24 hrs:   BP Temp Temp src Pulse Resp SpO2 Height Weight   06/29/23 1130 (!) 135/90 -- -- 70 14 94 % -- --   06/29/23 1115 (!) 142/101 -- -- 70 13 94 % -- --   06/29/23 1100 135/86 -- -- 64 -- 95 % -- --   06/29/23 1045 (!) 129/94 -- -- 66 -- 96 % -- --   06/29/23 1028 (!) 145/94 97.8  F (36.6  C) Temporal 65 18 96 % 1.803 m (5' 11\") 86.2 kg (190 lb)        Physical Exam  Vitals reviewed.   HENT:      Head: Normocephalic.   Cardiovascular:      Rate and Rhythm: Normal rate and regular rhythm.      Heart sounds: Normal heart sounds.   Pulmonary:      Effort: Pulmonary effort is normal.      Breath sounds: Normal breath sounds.   Abdominal:      General: Bowel sounds are normal.      Palpations: Abdomen is soft.   Musculoskeletal:         General: Normal range of motion.   Skin:     General: Skin " is warm.   Neurological:      General: No focal deficit present.      Mental Status: He is alert and oriented to person, place, and time.   Psychiatric:         Mood and Affect: Mood normal.         Emergency Department Course   ECG  ECG taken at 1028, ECG read at 1032  Normal sinus rhythm  Normal ECG  Rate 74 bpm. MN interval 178 ms. QRS duration 92 ms. QT/QTc 376/417 ms. P-R-T axes 41 9 25.     Imaging:  Chest XR,  PA & LAT    (Results Pending)      Report per radiology    Laboratory:  Labs Ordered and Resulted from Time of ED Arrival to Time of ED Departure   BASIC METABOLIC PANEL - Abnormal       Result Value    Sodium 140      Potassium 4.2      Chloride 102      Carbon Dioxide (CO2) 25      Anion Gap 13      Urea Nitrogen 14.0      Creatinine 1.18 (*)     Calcium 9.8      Glucose 86      GFR Estimate 75     TROPONIN T, HIGH SENSITIVITY - Normal    Troponin T, High Sensitivity <6     CBC WITH PLATELETS AND DIFFERENTIAL    WBC Count 5.3      RBC Count 5.08      Hemoglobin 15.1      Hematocrit 46.3      MCV 91      MCH 29.7      MCHC 32.6      RDW 13.2      Platelet Count 288      % Neutrophils 52      % Lymphocytes 35      % Monocytes 10      % Eosinophils 2      % Basophils 1      % Immature Granulocytes 0      NRBCs per 100 WBC 0      Absolute Neutrophils 2.7      Absolute Lymphocytes 1.8      Absolute Monocytes 0.5      Absolute Eosinophils 0.1      Absolute Basophils 0.1      Absolute Immature Granulocytes 0.0      Absolute NRBCs 0.0          Emergency Department Course & Assessments:      Interventions:  Medications   aspirin (ASA) tablet 325 mg (has no administration in time range)        Assessments:  1110 I obtained history and examined the patient as noted above.  1300 I rechecked the patient and explained findings.    Independent Interpretation (X-rays, CTs, rhythm strip):    Consultations/Discussion of Management or Tests:  1240 I spoke with Dr. Chinchilla, hospitalist, who accepts the patient.      Social Determinants of Health affecting care:   None    Disposition:  The patient was admitted to the hospital under the care of Dr. Chinchilla.     Impression & Plan    CMS Diagnoses: None    Medical Decision Making:  Patient presents with progressively worsening chest pain.  Clearly similar to when he has had coronary artery disease in the past and described as similar as when he needed stents.  High heart score.  EKG is normal and troponin is negative.  Care was discussed with patient feels more comfortable with admission and cardiology consultation.  Care was discussed with the hospitalist and was admitted on observation.      Diagnosis:    ICD-10-CM    1. Unstable angina (H)  I20.0            Scribe Disclosure:  [place], am serving as a scribe at 11:25 AM on 6/29/2023 to document services personally performed by Brody Last MD based on my observations and the provider's statements to me.   6/29/2023   Brody Last MD Goodman, Brian Samuel, MD  07/08/23 [id number]

## 2023-06-29 NOTE — H&P
"[facility]    History and Physical - Hospitalist Service       Date of Admission:  6/29/2023    Assessment & Plan      Chase Blackwell is a 52 year old male admitted on 6/29/2023 for cardiac workup in the setting of 3 days of substernal chest pain radiating to his back.    Past medical history is significant for abnormal stress echo, angiogram with 95% mid LAD lesion, stented (2017), hypothyroidism, and hypercholesterolemia.     Acute coronary syndrome  Unstable angina d/t high grade LAD stenosis, s/p drug-eluting stent  Abnormal stress echo and angiogram with 95% mid LAD lesion, stented in 2017. Was started on a beta blocker, ASA, brilinta, ACE inhibitor and statin at that time. Self-discontinued BB, ace inhibitor and brilinta early 2018. Intermittently compliant with ASA.   -- Cardiology consulted, appreciate recommendations   -- Received ASA 325mg while in the emergency department, continue ASA 81mg daily   -- Initial troponin <6, serial troponin x2 ordered   -- EKG reviewed; NSR. Rate 74 bpm. VT interval 178 ms. QRS duration 92 ms. QT/QTc 376/417 ms. P-R-T axes 41 9 25.   -- CXR did not demonstrate any acute cardiopulmonary disease   -- ECHO ordered. Most recent ECHO 09/2017 with EF 50-55%  -- Monitor on telemetry     Dyslipidemia  Lipid panel (10/2022) reviewed in Care Everywhere: total cholesterol 294, triglycerides 199, HDL 43, . Patient non-compliant with statin because of history of myalgias. Adamantly opposes statin.   -- Will recheck lipid panel    Hypothyroidism   Most recent TSH 10.17 (10/2022). Self discontinued levothyroxine (2018) because he \"didn't want to take more medications.\" He reports symptoms of fatigue for the last several days; denies sensitivity to cold or feeling cold.   -- Will recheck TSH during this hospitalization, however patient states that he is unlikely to want to restart levothyroxine. Would prefer to manage \"with herbs.\"      GERD  -- Continue PTA " "Prilosec        Diet: Regular Diet Adult  DVT Prophylaxis: ASA   Lopez Catheter: Not present  Lines: None     Cardiac Monitoring: ACTIVE order. Indication: Chest pain/ ACS rule out (24 hours)  Code Status: Full Code    Clinically Significant Risk Factors Present on Admission                # Drug Induced Platelet Defect: home medication list includes an antiplatelet medication        # Overweight: Estimated body mass index is 26.5 kg/m  as calculated from the following:    Height as of this encounter: 1.803 m (5' 11\").    Weight as of this encounter: 86.2 kg (190 lb).            Disposition Plan      Expected Discharge Date: 06/30/2023                The patient's care was discussed with the Attending Physician, Dr. Chinchilla.    REN Nunez [facility]  Hospitalist Service  [facility]  Securely message with [address] (more info)  Text page via [facility] Paging/Directory     ______________________________________________________________________    Chief Complaint   Chest pain    History is obtained from the patient    History of Present Illness   Chase Blackwell is a 52 year old male who has a past medical history is significant for abnormal stress echo, angiogram with 95% mid LAD lesion, stented (2017), hypothyroidism and hypercholesterolemia.    Patient was admitted 6/29/2023 due to three days of intermittent chest pain.     Patient was seen in the emergency department. He presented today with worsening substernal chest pain that is exertional in nature and radiates to his back. Pain started while he was walking at work 3 days ago. Initial episode of chest pain was associated with shortness of breath and dizziness. It improved after he took nitrostat x2 and sat down to rest. It is not reproducible with movement or palpation. Since initial episode, chest pain episodes have increased in frequency and duration which is what prompted his trip to the emergency department today. These symptoms are " "similar to the symptoms he experienced in 2017 prior to his stent placement. Currently he rates his pain 2/10 and states that it radiates straight through to his back. He denies SOB, N/V at the time of my evaluation.     We reviewed his medical history and home medications. Patient is largely non-compliant with medications. He self-discontinued his BB, ace inhibitor, brilinta, levothyroxine and statin. He reports that he prefers to treat his past medical problems with \"herbs.\"       Past Medical History    Past Medical History:   Diagnosis Date     Gastro-oesophageal reflux disease      Hyperlipidaemia      Thyroid disease        Past Surgical History   Past Surgical History:   Procedure Laterality Date     ANGIOGRAM  06/22/2017    MICHELLE to mLAD     HERNIA REPAIR       SOFT TISSUE SURGERY         Prior to Admission Medications   Prior to Admission Medications   Prescriptions Last Dose Informant Patient Reported? Taking?   aspirin EC 81 MG EC tablet 6/28/2023 at AM Self No Yes   Sig: Take 1 tablet (81 mg) by mouth daily   nitroglycerin (NITROSTAT) 0.4 MG sublingual tablet 6/27/2023 at PRN, unknown time Self No Yes   Sig: For chest pain place 1 tablet under the tongue every 5 minutes for 3 doses. If symptoms persist 5 minutes after 1st dose call 911.      Facility-Administered Medications: None      Physical Exam   Vital Signs: Temp: 97.8  F (36.6  C) Temp src: Temporal BP: (!) 135/90 Pulse: 70   Resp: 14 SpO2: 94 % O2 Device: None (Room air)    Weight: 190 lbs 0 oz    Physical Exam  Vitals and nursing note reviewed.   Cardiovascular:      Rate and Rhythm: Normal rate and regular rhythm.      Pulses: Normal pulses.      Heart sounds: Normal heart sounds. No murmur heard.     No friction rub. No gallop.   Pulmonary:      Effort: Pulmonary effort is normal.      Breath sounds: Normal breath sounds.   Abdominal:      Palpations: Abdomen is soft.      Tenderness: There is no abdominal tenderness.   Musculoskeletal:      " Right lower leg: No edema.      Left lower leg: No edema.   Skin:     General: Skin is warm and dry.      Capillary Refill: Capillary refill takes less than 2 seconds.   Neurological:      Mental Status: He is alert and oriented to person, place, and time.   Psychiatric:         Behavior: Behavior is cooperative.         Medical Decision Making       Medical Decision Making       80 MINUTES SPENT BY ME on the date of service doing chart review, history, exam, documentation & further activities per the note.      Data     I have personally reviewed the following data over the past 24 hrs:    5.3  \   15.1   / 288     140 102 14.0 /  86   4.2 25 1.18 (H) \       Trop: <6 BNP: N/A       Imaging results reviewed over the past 24 hrs:   Recent Results (from the past 24 hour(s))   Chest XR,  PA & LAT    Narrative    XR CHEST 2 VIEWS   6/29/2023 1:15 PM     HISTORY: chest pain    COMPARISON: Chest radiograph 6/21/2017      Impression    IMPRESSION: No acute cardiopulmonary disease.    CINTHIA FRASER MD         SYSTEM ID:  [id number]

## 2023-06-29 NOTE — ED NOTES
[facility]  ED Nurse Handoff Report    ED Chief complaint: Chest Pain      ED Diagnosis:   Final diagnoses:   Unstable angina (H)       Code Status: Full Code    Allergies:   Allergies   Allergen Reactions     Levofloxacin Muscle Pain (Myalgia)     Added per Care Everywhere, patient unsure of allergy 6/29/23     Zolmitriptan      Chest pain, per Care Everywhere and patient 6/29/23     Depakote [Valproic Acid] Anxiety     Makes pt feel anxious and tight in the upper chest       Patient Story: intermittent chest pain for few days while at work (active but not strenuous). Occasionally associated with dizziness and SOB.  Focused Assessment:  Denies symptoms upon arrival  Labs Ordered and Resulted from Time of ED Arrival to Time of ED Departure   BASIC METABOLIC PANEL - Abnormal       Result Value    Sodium 140      Potassium 4.2      Chloride 102      Carbon Dioxide (CO2) 25      Anion Gap 13      Urea Nitrogen 14.0      Creatinine 1.18 (*)     Calcium 9.8      Glucose 86      GFR Estimate 75     TROPONIN T, HIGH SENSITIVITY - Normal    Troponin T, High Sensitivity <6     CBC WITH PLATELETS AND DIFFERENTIAL    WBC Count 5.3      RBC Count 5.08      Hemoglobin 15.1      Hematocrit 46.3      MCV 91      MCH 29.7      MCHC 32.6      RDW 13.2      Platelet Count 288      % Neutrophils 52      % Lymphocytes 35      % Monocytes 10      % Eosinophils 2      % Basophils 1      % Immature Granulocytes 0      NRBCs per 100 WBC 0      Absolute Neutrophils 2.7      Absolute Lymphocytes 1.8      Absolute Monocytes 0.5      Absolute Eosinophils 0.1      Absolute Basophils 0.1      Absolute Immature Granulocytes 0.0      Absolute NRBCs 0.0       Chest XR,  PA & LAT    (Results Pending)         Treatments and/or interventions provided: monitoring, aspirin  Patient's response to treatments and/or interventions: VSS    To be done/followed up on inpatient unit:  per admitting    Does this patient have any cognitive  concerns?: n/a    Activity level - Baseline/Home:  Independent  Activity Level - Current:   Independent    Patient's Preferred language: English   Needed?: No    Isolation: None  Infection: Not Applicable  Patient tested for COVID 19 prior to admission: NO  Bariatric?: No    Vital Signs:   Vitals:    06/29/23 1045 06/29/23 1100 06/29/23 1115 06/29/23 1130   BP: (!) 129/94 135/86 (!) 142/101 (!) 135/90   BP Location:       Patient Position:       Cuff Size:       Pulse: 66 64 70 70   Resp:   13 14   Temp:       TempSrc:       SpO2: 96% 95% 94% 94%   Weight:       Height:           Cardiac Rhythm:Cardiac Rhythm: Normal sinus rhythm    Was the PSS-3 completed:   Yes  What interventions are required if any?               Family Comments: none present in ED  OBS brochure/video discussed/provided to patient/family: Yes    For the majority of the shift this patient's behavior was Green.   Behavioral interventions performed were care and rounding.    ED NURSE PHONE NUMBER: [phone]

## 2023-06-29 NOTE — PROGRESS NOTES
RECEIVING UNIT ED HANDOFF REVIEW    ED Nurse Handoff Report was reviewed by: Marcel Forde RN on June 29, 2023 at 1:58 PM

## 2023-06-30 VITALS
OXYGEN SATURATION: 96 % | DIASTOLIC BLOOD PRESSURE: 79 MMHG | TEMPERATURE: 98.4 F | RESPIRATION RATE: 18 BRPM | SYSTOLIC BLOOD PRESSURE: 118 MMHG | WEIGHT: 191.7 LBS | HEART RATE: 62 BPM | HEIGHT: 71 IN | BODY MASS INDEX: 26.84 KG/M2

## 2023-06-30 LAB
ANION GAP SERPL CALCULATED.3IONS-SCNC: 10 MMOL/L (ref 7–15)
BUN SERPL-MCNC: 16.2 MG/DL (ref 6–20)
CALCIUM SERPL-MCNC: 9.5 MG/DL (ref 8.6–10)
CHLORIDE SERPL-SCNC: 102 MMOL/L (ref 98–107)
CHOLEST SERPL-MCNC: 277 MG/DL
CREAT SERPL-MCNC: 1.08 MG/DL (ref 0.67–1.17)
DEPRECATED HCO3 PLAS-SCNC: 26 MMOL/L (ref 22–29)
ERYTHROCYTE [DISTWIDTH] IN BLOOD BY AUTOMATED COUNT: 12.8 % (ref 10–15)
GFR SERPL CREATININE-BSD FRML MDRD: 83 ML/MIN/1.73M2
GLUCOSE SERPL-MCNC: 101 MG/DL (ref 70–99)
HCT VFR BLD AUTO: 46.7 % (ref 40–53)
HDLC SERPL-MCNC: 40 MG/DL
HGB BLD-MCNC: 15.4 G/DL (ref 13.3–17.7)
LDLC SERPL CALC-MCNC: 211 MG/DL
MCH RBC QN AUTO: 29.7 PG (ref 26.5–33)
MCHC RBC AUTO-ENTMCNC: 33 G/DL (ref 31.5–36.5)
MCV RBC AUTO: 90 FL (ref 78–100)
NONHDLC SERPL-MCNC: 237 MG/DL
PLATELET # BLD AUTO: 261 10E3/UL (ref 150–450)
POTASSIUM SERPL-SCNC: 4.5 MMOL/L (ref 3.4–5.3)
RBC # BLD AUTO: 5.18 10E6/UL (ref 4.4–5.9)
SODIUM SERPL-SCNC: 138 MMOL/L (ref 136–145)
TRIGL SERPL-MCNC: 130 MG/DL
WBC # BLD AUTO: 5 10E3/UL (ref 4–11)

## 2023-06-30 PROCEDURE — 99238 HOSP IP/OBS DSCHRG MGMT 30/<: CPT | Performed by: INTERNAL MEDICINE

## 2023-06-30 PROCEDURE — 36415 COLL VENOUS BLD VENIPUNCTURE: CPT | Performed by: NURSE PRACTITIONER

## 2023-06-30 PROCEDURE — G0378 HOSPITAL OBSERVATION PER HR: HCPCS

## 2023-06-30 PROCEDURE — 80048 BASIC METABOLIC PNL TOTAL CA: CPT | Performed by: NURSE PRACTITIONER

## 2023-06-30 PROCEDURE — 85027 COMPLETE CBC AUTOMATED: CPT | Performed by: NURSE PRACTITIONER

## 2023-06-30 PROCEDURE — 250N000013 HC RX MED GY IP 250 OP 250 PS 637: Performed by: NURSE PRACTITIONER

## 2023-06-30 RX ADMIN — ASPIRIN 81 MG CHEWABLE TABLET 81 MG: 81 TABLET CHEWABLE at 08:41

## 2023-06-30 ASSESSMENT — ACTIVITIES OF DAILY LIVING (ADL)
ADLS_ACUITY_SCORE: 33

## 2023-06-30 NOTE — DISCHARGE SUMMARY
"[facility]    Discharge Summary  Hospitalist    Date of Admission:  6/29/2023  Date of Discharge:  Patient left AMA on 6/30/2023  Discharging Provider: Lindsey Branch, DO    Discharge Diagnoses   Unstable angina  CAD with hx of LAD stenting in 2017  Severe dyslipidemia  Untreated hypothyroidism    History of Present Illness   Chase Blackwell is an 52 year old male with PMHx of CAD with hx of stenting to LAD, dyslipidemia, hypothyroidm and GERD who was admitted under observation status on 6/29/2023 for evaluation of chest pain concerning for unstable angina    Hospital Course   Chase Blackwell was admitted on 6/29/2023.  The following problems were addressed during his hospitalization:    Patient initially presented to the ED for evaluation of episodes of chest tightness that were consistent with his previous episodes of angina.  These episodes were occurring with minimal exertion.  He has a history of CAD with prior LAD stenting in 2017.  He has noted severe dyslipidemia with an LDL of 211.  He is intolerant to statins secondary to myalgias.     In the ED, EKG showed sinus rhythm with no acute ischemic changes.  Troponins were negative x2.  Chest x-ray was negative.  A resting echocardiogram was done that showed an EF of 55 to 60%, normal diastolic function and no wall abnormalities.  Cardiology was consulted.  Patient was offered stress testing versus coronary angiogram.  Patient elected to pursue further evaluation with coronary angiogram.    On 6/30 AM, patient was informed history coronary angiogram could not be done until later in the day.  He became upset with hearing this and stated he \"had other things\" he needed to do.  Ultimately elected to leave the hospital AGAINST MEDICAL ADVICE.  It was recommended that he contact [facility] cardiology to coordinate an outpatient coronary angiogram.  He was informed to return to the ER if his chest pain were to recur.  No new medications were " "prescribed at the time of discharge    Also of note, patient has a history of untreated hypothyroidism.  He had previously stopped taking levothyroxine in 2018 because he \"did not want to take any more medications\".  He reported some increased fatigue in the days prior to admission. TSH was notably elevated at 12 (had been 10 in 10/2022), patient declined to resume levothyroxine and stated he would prefer to manage \"with herbs\".    Lindsey Branch DO    Code Status   Full Code       Primary Care Physician   Live Lott MD    Physical Exam   Temp: 98.4  F (36.9  C) Temp src: Oral BP: 118/79 Pulse: 62   Resp: 18 SpO2: 96 % O2 Device: None (Room air)    Vitals:    06/29/23 1028 06/29/23 1436   Weight: 86.2 kg (190 lb) 87 kg (191 lb 11.2 oz)     Vital Signs with Ranges  Temp:  [98  F (36.7  C)-98.4  F (36.9  C)] 98.4  F (36.9  C)  Pulse:  [62-74] 62  Resp:  [16-18] 18  BP: (116-138)/(76-89) 118/79  SpO2:  [96 %-98 %] 96 %  I/O last 3 completed shifts:  In: 400 [P.O.:400]  Out: -     I did not get to examine the patient on day of discharge prior to him leaving the hospital AMA    Discharge Disposition   Discharged to home against medical advice    Consultations This Hospital Stay   CARDIOLOGY IP CONSULT    Time Spent on this Encounter   I, Lindsey Branch DO, personally saw the patient today and spent less than or equal to 30 minutes discharging this patient.    Discharge Orders      Lipid Profile     Discharge Medications   Discharge Medication List as of 6/30/2023 11:41 AM      CONTINUE these medications which have NOT CHANGED    Details   aspirin EC 81 MG EC tablet Take 1 tablet (81 mg) by mouth daily, Disp-60 tablet, R-11, E-Prescribe      nitroglycerin (NITROSTAT) 0.4 MG sublingual tablet For chest pain place 1 tablet under the tongue every 5 minutes for 3 doses. If symptoms persist 5 minutes after 1st dose call 911., Disp-25 tablet, R-1, E-Prescribe           Allergies   Allergies   Allergen " Reactions     Levofloxacin Muscle Pain (Myalgia)     Added per Care Everywhere, patient unsure of allergy 23     Pravastatin Muscle Pain (Myalgia)     Pt refuses all statins     Zolmitriptan      Chest pain, per Care Everywhere and patient 23     Depakote [Valproic Acid] Anxiety     Makes pt feel anxious and tight in the upper chest     Data   Most Recent 3 CBC's:Recent Labs   Lab Test 23  0726 23  1045 17  0510   WBC 5.0 5.3 9.7   HGB 15.4 15.1 14.6   MCV 90 91 89    288 202      Most Recent 3 BMP's:  Recent Labs   Lab Test 23  0726 23  1045 17  1203    140 142   POTASSIUM 4.5 4.2 3.9   CHLORIDE 102 102 105   CO2 26 25 31*   BUN 16.2 14.0 17   CR 1.08 1.18* 1.10   ANIONGAP 10 13 9.9   TOY 9.5 9.8 9.4   * 86 114*     Most Recent Cholesterol Panel:  Recent Labs   Lab Test 23  1403   CHOL 277*   *   HDL 40   TRIG 130     Most Recent TSH, T4 and A1c Labs:  Recent Labs   Lab Test 23  1403 17  0540   TSH 12.87* 37.14*   T4  --  0.64*     Results for orders placed or performed during the hospital encounter of 23   Chest XR,  PA & LAT    Narrative    XR CHEST 2 VIEWS   2023 1:15 PM     HISTORY: chest pain    COMPARISON: Chest radiograph 2017      Impression    IMPRESSION: No acute cardiopulmonary disease.    CINTHIA FRASER MD         SYSTEM ID:  [id number]   Echocardiogram Complete     Value    LVEF  55-60%    Narrative    [id number]  [id number]  [id number]  [id number]^ADRIANO^JOHANA     [facility]  Echocardiography Laboratory  [address] [place] [address]     Name: BERNIE MOSS  MRN: [id number]  : 1970  Study Date: 2023 01:33 PM  Age: 52 yrs  Gender: Male  Patient Location: [facility]  Reason For Study: Syncope  Ordering Physician: JOHANA OH  Referring Physician: Live Lott  Performed By: Nadine Dalton     BSA: 2.1 m2  Height: 71 in  Weight: 190 lb  HR: 60  BP: 131/84  mmHg  ______________________________________________________________________________  Procedure  Complete Portable Echo Adult. Optison (NDC #[id number]) given intravenously.  ______________________________________________________________________________  Interpretation Summary     The left ventricle is normal in size.  There is normal left ventricular wall thickness.  The visual ejection fraction is 55-60%.  Left ventricular diastolic function is normal.  No regional wall motion abnormalities noted.  No hemodynamically significant valvular heart disease.  ______________________________________________________________________________  Left Ventricle  The left ventricle is normal in size. There is normal left ventricular wall  thickness. The visual ejection fraction is 55-60%. Left ventricular diastolic  function is normal. No regional wall motion abnormalities noted.     Right Ventricle  The right ventricle is normal in size and function.     Atria  Normal left atrial size. Right atrial size is normal. There is no color  Doppler evidence of an atrial shunt.     Mitral Valve  The mitral valve is normal in structure and function. There is trace mitral  regurgitation.     Tricuspid Valve  Right ventricle systolic pressure estimate normal. There is trace tricuspid  regurgitation. The right ventricular systolic pressure is approximated at 18.5  mmHg plus the right atrial pressure. IVC diameter >2.1 cm collapsing <50% with  sniff suggests a high RA pressure estimated at 15 mmHg or greater.     Aortic Valve  Normal tricuspid aortic valve. No aortic regurgitation is present.     Pulmonic Valve  There is no pulmonic valvular regurgitation.     Vessels  Normal size aorta. Borderline aortic root dilatation.     Pericardium  There is no pericardial effusion.     ______________________________________________________________________________  MMode/2D Measurements & Calculations  IVSd: 0.88 cm  LVIDd: 4.9 cm  LVIDs: 3.6 cm  LVPWd:  0.84 cm  FS: 27.0 %  LV mass(C)d: 143.2 grams  LV mass(C)dI: 69.4 grams/m2     Ao root diam: 4.0 cm  LA dimension: 3.2 cm  asc Aorta Diam: 3.4 cm  LA/Ao: 0.80  LVOT diam: 2.4 cm  LVOT area: 4.5 cm2  LA Volume (BP): 31.3 ml  LA Volume Index (BP): 15.2 ml/m2  RWT: 0.34  TAPSE: 2.4 cm     Doppler Measurements & Calculations  MV E max yoshi: 64.3 cm/sec  MV A max yoshi: 81.4 cm/sec  MV E/A: 0.79     MV dec time: 0.20 sec  Ao V2 max: 101.0 cm/sec  Ao max P.0 mmHg  Ao V2 mean: 68.5 cm/sec  Ao mean P.0 mmHg  Ao V2 VTI: 21.7 cm  NEGAR(I,D): 3.8 cm2  NEGAR(V,D): 4.1 cm2  LV V1 max PG: 3.4 mmHg  LV V1 max: 92.5 cm/sec  LV V1 VTI: 18.2 cm  SV(LVOT): 82.3 ml  SI(LVOT): 39.9 ml/m2  PA acc time: 0.14 sec  TR max yoshi: 215.0 cm/sec  TR max P.5 mmHg  AV Yoshi Ratio (DI): 0.92  NEGAR Index (cm2/m2): 1.8  E/E' av.0  Lateral E/e': 4.9  Medial E/e': 7.1  RV S Yoshi: 11.7 cm/sec     ______________________________________________________________________________  Report approved by: Raffi Lofton 2023 04:10 PM

## 2023-06-30 NOTE — PROVIDER NOTIFICATION
MD Notification    Notified Person: MD    Notified Person Name:Dr Marcel Vyas    Notification Date/Time:1147 6/30/23    Notification Interaction: [employer] messaging     Purpose of Notification:SLOANE pt left AMA, says he doesn't have time to complete the angiogram today.     Orders Received:    Comments:

## 2023-06-30 NOTE — PROVIDER NOTIFICATION
"MD Notification    Notified Person: MD    Notified Person Name:Dr Branch    Notification Date/Time:1119 6/30/23    Notification Interaction:[email or web address] messaging    Purpose of Notification:pt is still adamant to leave, I explained the risks of leaving and he states understanding. He had no chest pain until now, but says he sat down and feels better. Also, he drove here so he will be driving himself home.     Orders Received:    Comments:Per Dr Branch, \"Okay. Can have him sign the AMA paperwork and will need to let cardiology know. Would advise him to call [facility] cardiology to discuss scheduling an outpatient angiogram.\"       "

## 2023-06-30 NOTE — DISCHARGE SUMMARY
Pt left AMA, form signed. Pt is A/Ox4, VSS on room air, tele SR, pt reports mild chest pain, relieved with sitting down. Risks of leaving without completing the angiogram explained to pt, pt states he understands the risk of a potential heart attack of chest pain left untreated. Encouraged pt to reconsider staying to complete appropriate testing. Pt very adamant to leave. [facility] heart clinic number given to pt to schedule outpt angiogram. Instructed pt to return to ED if symptoms worsen. Pt in agreement. Hospitalist Dr Branch and Cardiologist Dr Asael plata. Pt drove self to hospital.

## 2023-06-30 NOTE — PROVIDER NOTIFICATION
MD Notification    Notified Person: MD    Notified Person Name:Dr Branch     Notification Date/Time:1105 6/30/23    Notification Interaction:[employer] messaging     Purpose of Notification:   Pt is upset that his procedure is postponed until 3pm today, he wants to leave right now. He says  he has alot of things to do at home     Orders Received:    Comments:

## 2023-06-30 NOTE — PLAN OF CARE
Orientation/Cognitive: A&OX4  Observation Goals (Met/ Not Met): Not Met  Mobility Level/Assist Equipment: Indipendent  Fall Risk (Y/N): N  Behavior Concerns: Green  Pain Management: Denies  Tele/VS/O2: VSS/RA, Tele NSR  ABNL Lab/BG: See Chart  Diet: Regular, NPO From Midnight  Bowel/Bladder: Continent  Skin Concerns: None  Drains/Devices: PIV/SL  Tests/Procedures for next shift: Cardiology Following   Anticipated DC date & active delays: 03/30/23      Observation goals  PRIOR TO DISCHARGE       Comments: -diagnostic tests and consults completed and resulted: Not met  -vital signs normal or at patient baseline: Met   -tolerating oral intake to maintain hydration: Met   -adequate pain control on oral analgesics: Met   -returns to baseline functional status: Met   -safe disposition plan has been identified: Met   Nurse to notify provider when observation goals have been met and patient is ready for discharge.

## 2025-05-14 ENCOUNTER — HOSPITAL ENCOUNTER (EMERGENCY)
Facility: CLINIC | Age: 55
Discharge: HOME | End: 2025-05-14
Attending: EMERGENCY MEDICINE | Admitting: EMERGENCY MEDICINE
Payer: COMMERCIAL

## 2025-05-14 VITALS
HEIGHT: 71 IN | DIASTOLIC BLOOD PRESSURE: 80 MMHG | OXYGEN SATURATION: 95 % | WEIGHT: 185 LBS | TEMPERATURE: 98.4 F | SYSTOLIC BLOOD PRESSURE: 126 MMHG | BODY MASS INDEX: 25.9 KG/M2 | HEART RATE: 66 BPM | RESPIRATION RATE: 20 BRPM

## 2025-05-14 DIAGNOSIS — L02.426 FURUNCLE OF LEFT LOWER LIMB: ICD-10-CM

## 2025-05-14 DIAGNOSIS — L03.116 CELLULITIS OF LEFT LEG: ICD-10-CM

## 2025-05-14 PROCEDURE — 250N000011 HC RX IP 250 OP 636: Mod: JZ | Performed by: EMERGENCY MEDICINE

## 2025-05-14 PROCEDURE — 99284 EMERGENCY DEPT VISIT MOD MDM: CPT | Mod: 25

## 2025-05-14 PROCEDURE — 96375 TX/PRO/DX INJ NEW DRUG ADDON: CPT

## 2025-05-14 PROCEDURE — 96365 THER/PROPH/DIAG IV INF INIT: CPT

## 2025-05-14 RX ORDER — CEFAZOLIN SODIUM 2 G/50ML
2 SOLUTION INTRAVENOUS ONCE
Status: COMPLETED | OUTPATIENT
Start: 2025-05-14 | End: 2025-05-14

## 2025-05-14 RX ORDER — KETOROLAC TROMETHAMINE 15 MG/ML
15 INJECTION, SOLUTION INTRAMUSCULAR; INTRAVENOUS ONCE
Status: COMPLETED | OUTPATIENT
Start: 2025-05-14 | End: 2025-05-14

## 2025-05-14 RX ORDER — CEPHALEXIN 500 MG/1
500 CAPSULE ORAL 4 TIMES DAILY
Qty: 28 CAPSULE | Refills: 0 | Status: SHIPPED | OUTPATIENT
Start: 2025-05-14 | End: 2025-05-21

## 2025-05-14 RX ADMIN — CEFAZOLIN SODIUM 2 G: 2 SOLUTION INTRAVENOUS at 07:34

## 2025-05-14 RX ADMIN — KETOROLAC TROMETHAMINE 15 MG: 15 INJECTION, SOLUTION INTRAMUSCULAR; INTRAVENOUS at 07:32

## 2025-05-14 ASSESSMENT — ACTIVITIES OF DAILY LIVING (ADL)
ADLS_ACUITY_SCORE: 46
ADLS_ACUITY_SCORE: 46

## 2025-05-14 ASSESSMENT — COLUMBIA-SUICIDE SEVERITY RATING SCALE - C-SSRS
6. HAVE YOU EVER DONE ANYTHING, STARTED TO DO ANYTHING, OR PREPARED TO DO ANYTHING TO END YOUR LIFE?: NO
1. IN THE PAST MONTH, HAVE YOU WISHED YOU WERE DEAD OR WISHED YOU COULD GO TO SLEEP AND NOT WAKE UP?: NO
2. HAVE YOU ACTUALLY HAD ANY THOUGHTS OF KILLING YOURSELF IN THE PAST MONTH?: NO

## 2025-05-14 NOTE — ED PROVIDER NOTES
"  Emergency Department Note      History of Present Illness     Chief Complaint   Knee Pain (Left knee started swelling yesterday. )      HPI   Chase Blackwell is a 54 year old male who presents to the emergency department with a 1 day history of a painful red lesion involving the superior/anterior shin.  He notes that its become increasingly tender to palpation since yesterday.  He does not not remember any specific insect bite in the location.  He has not had any systemic features such as fevers, chills, or sweats.    Independent Historian       Review of External Notes       Past Medical History     Medical History and Problem List   Past Medical History:   Diagnosis Date    Gastro-oesophageal reflux disease     Hyperlipidaemia     Thyroid disease        Medications   aspirin EC 81 MG EC tablet  cephALEXin (KEFLEX) 500 MG capsule  nitroglycerin (NITROSTAT) 0.4 MG sublingual tablet        Surgical History   Past Surgical History:   Procedure Laterality Date    ANGIOGRAM  06/22/2017    MICHELLE to mLAD    HERNIA REPAIR      SOFT TISSUE SURGERY         Physical Exam     Patient Vitals for the past 24 hrs:   BP Temp Temp src Pulse Resp SpO2 Height Weight   05/14/25 0611 (!) 164/79 97.5  F (36.4  C) Oral 74 18 99 % 1.803 m (5' 11\") 83.9 kg (185 lb)     Physical Exam  Left leg    The patient has a 3 cm wide by 2 cm tall area of cellulitis around an area of folliculitis overlying roughly the tibial tubercle area.  There is no evidence of effusion to the knee or tenderness to the knee joint itself.  There is no evidence of septic arthropathy to the knee.  There is no bursitis.  The infected hair follicle can be seen in the center of the area.  There is no evidence of fluctuance or abscess that can be drained at this time.  This is consistent with folliculitis and mild cellulitis.  Patient has no history of MRSA.      Diagnostics     Lab Results   Labs Ordered and Resulted from Time of ED Arrival to Time of ED Departure - No " data to display    Imaging   No orders to display       ED Course      Medications Administered   Medications   ketorolac (TORADOL) injection 15 mg (has no administration in time range)   ceFAZolin (ANCEF) 2 g in dextrose 50 mL intermittent infusion (has no administration in time range)       Procedures   Procedures     Discussion of Management       ED Course    I reassessed the patient at 7:47 AM.  He is feeling better and ready to go home.    Additional Documentation      Medical Decision Making / Diagnosis     CMS Diagnoses: IV Antibiotics given and/or elevated Lactate of 0 and no sepsis note found - Delete this reminder and enter the sepsis note or '.edcms' before signing chart.>>>    MIPS                  [facility]   Chase Blackwell is a 54 year old male presents the emergency department with an area of folliculitis/cellulitis involving the left superior anterior shin.  This is most likely a strep or staph infection.  Patient has no history of MRSA.  Patient was treated with intravenous Ancef 2 g and will be started on cephalexin.  Patient was advised if the redness is extending beyond the skin marker line drawn in the next 24 hours, he should Vonnie present to the emergency department for additional management.    Disposition   The patient was discharged.     Diagnosis     ICD-10-CM    1. Furuncle of left lower limb  L02.426       2. Cellulitis of left leg  L03.116            Discharge Medications   New Prescriptions    CEPHALEXIN (KEFLEX) 500 MG CAPSULE    Take 1 capsule (500 mg) by mouth 4 times daily for 7 days.         MD Yassine Simmons Michael P, MD  05/14/25 0704       Danyel Metzger MD  05/14/25 0705       Danyel Metzger MD  05/14/25 0747

## 2025-05-14 NOTE — ED TRIAGE NOTES
Pt reports left knee pain started yesterday. Small area of redness and swelling below left kneecap.      Triage Assessment (Adult)       Row Name 05/14/25 0612          Triage Assessment    Airway WDL WDL        Respiratory WDL    Respiratory WDL WDL        Skin Circulation/Temperature WDL    Skin Circulation/Temperature WDL X  red and swollen area below left kneecap        Cardiac WDL    Cardiac WDL WDL        Peripheral/Neurovascular WDL    Peripheral Neurovascular WDL WDL        Cognitive/Neuro/Behavioral WDL    Cognitive/Neuro/Behavioral WDL WDL                     
no

## 2025-05-14 NOTE — DISCHARGE INSTRUCTIONS
Finally on the red spot on your leg.  If this is getting significantly worse overnight into tomorrow, please return to the emergency department.   your prescription at the [facility] pharmacy and take this 4 times a day for the next 7 days.

## 2025-05-16 ENCOUNTER — HOSPITAL ENCOUNTER (EMERGENCY)
Facility: CLINIC | Age: 55
Discharge: HOME | End: 2025-05-16
Attending: EMERGENCY MEDICINE | Admitting: EMERGENCY MEDICINE
Payer: COMMERCIAL

## 2025-05-16 VITALS
DIASTOLIC BLOOD PRESSURE: 90 MMHG | OXYGEN SATURATION: 98 % | TEMPERATURE: 98.3 F | SYSTOLIC BLOOD PRESSURE: 123 MMHG | RESPIRATION RATE: 16 BRPM | HEART RATE: 66 BPM

## 2025-05-16 DIAGNOSIS — L03.116 CELLULITIS OF LEFT LOWER EXTREMITY: ICD-10-CM

## 2025-05-16 LAB
ALBUMIN SERPL BCG-MCNC: 4.2 G/DL (ref 3.5–5.2)
ALP SERPL-CCNC: 75 U/L (ref 40–150)
ALT SERPL W P-5'-P-CCNC: 16 U/L (ref 0–70)
ANION GAP SERPL CALCULATED.3IONS-SCNC: 10 MMOL/L (ref 7–15)
AST SERPL W P-5'-P-CCNC: 23 U/L (ref 0–45)
BASE EXCESS BLDV CALC-SCNC: 0 MMOL/L (ref -3–3)
BASOPHILS # BLD AUTO: 0.1 10E3/UL (ref 0–0.2)
BASOPHILS NFR BLD AUTO: 1 %
BILIRUB SERPL-MCNC: 0.4 MG/DL
BUN SERPL-MCNC: 15 MG/DL (ref 6–20)
CALCIUM SERPL-MCNC: 8.8 MG/DL (ref 8.8–10.4)
CHLORIDE SERPL-SCNC: 106 MMOL/L (ref 98–107)
CREAT SERPL-MCNC: 1.01 MG/DL (ref 0.67–1.17)
EGFRCR SERPLBLD CKD-EPI 2021: 89 ML/MIN/1.73M2
EOSINOPHIL # BLD AUTO: 0.2 10E3/UL (ref 0–0.7)
EOSINOPHIL NFR BLD AUTO: 3 %
ERYTHROCYTE [DISTWIDTH] IN BLOOD BY AUTOMATED COUNT: 13.1 % (ref 10–15)
GLUCOSE SERPL-MCNC: 110 MG/DL (ref 70–99)
HCO3 BLDV-SCNC: 25 MMOL/L (ref 21–28)
HCO3 SERPL-SCNC: 23 MMOL/L (ref 22–29)
HCT VFR BLD AUTO: 42.2 % (ref 40–53)
HGB BLD-MCNC: 14.1 G/DL (ref 13.3–17.7)
IMM GRANULOCYTES # BLD: 0 10E3/UL
IMM GRANULOCYTES NFR BLD: 0 %
LACTATE BLD-SCNC: 1 MMOL/L (ref 0.7–2)
LYMPHOCYTES # BLD AUTO: 2.4 10E3/UL (ref 0.8–5.3)
LYMPHOCYTES NFR BLD AUTO: 31 %
MCH RBC QN AUTO: 30 PG (ref 26.5–33)
MCHC RBC AUTO-ENTMCNC: 33.4 G/DL (ref 31.5–36.5)
MCV RBC AUTO: 90 FL (ref 78–100)
MONOCYTES # BLD AUTO: 0.9 10E3/UL (ref 0–1.3)
MONOCYTES NFR BLD AUTO: 11 %
NEUTROPHILS # BLD AUTO: 4.3 10E3/UL (ref 1.6–8.3)
NEUTROPHILS NFR BLD AUTO: 55 %
NRBC # BLD AUTO: 0 10E3/UL
NRBC BLD AUTO-RTO: 0 /100
PCO2 BLDV: 43 MM HG (ref 40–50)
PH BLDV: 7.37 [PH] (ref 7.32–7.43)
PLATELET # BLD AUTO: 223 10E3/UL (ref 150–450)
PO2 BLDV: 41 MM HG (ref 25–47)
POTASSIUM SERPL-SCNC: 3.9 MMOL/L (ref 3.4–5.3)
PROT SERPL-MCNC: 7.5 G/DL (ref 6.4–8.3)
RBC # BLD AUTO: 4.7 10E6/UL (ref 4.4–5.9)
SAO2 % BLDV: 75 % (ref 70–75)
SODIUM SERPL-SCNC: 139 MMOL/L (ref 135–145)
WBC # BLD AUTO: 7.8 10E3/UL (ref 4–11)

## 2025-05-16 PROCEDURE — 82310 ASSAY OF CALCIUM: CPT | Performed by: EMERGENCY MEDICINE

## 2025-05-16 PROCEDURE — 90471 IMMUNIZATION ADMIN: CPT | Performed by: EMERGENCY MEDICINE

## 2025-05-16 PROCEDURE — 250N000011 HC RX IP 250 OP 636: Performed by: EMERGENCY MEDICINE

## 2025-05-16 PROCEDURE — 250N000013 HC RX MED GY IP 250 OP 250 PS 637: Performed by: EMERGENCY MEDICINE

## 2025-05-16 PROCEDURE — 36415 COLL VENOUS BLD VENIPUNCTURE: CPT | Performed by: EMERGENCY MEDICINE

## 2025-05-16 PROCEDURE — 99283 EMERGENCY DEPT VISIT LOW MDM: CPT

## 2025-05-16 PROCEDURE — 83605 ASSAY OF LACTIC ACID: CPT

## 2025-05-16 PROCEDURE — 90715 TDAP VACCINE 7 YRS/> IM: CPT | Performed by: EMERGENCY MEDICINE

## 2025-05-16 PROCEDURE — 85004 AUTOMATED DIFF WBC COUNT: CPT | Performed by: EMERGENCY MEDICINE

## 2025-05-16 PROCEDURE — 87040 BLOOD CULTURE FOR BACTERIA: CPT | Performed by: EMERGENCY MEDICINE

## 2025-05-16 RX ORDER — IBUPROFEN 600 MG/1
600 TABLET, FILM COATED ORAL ONCE
Status: COMPLETED | OUTPATIENT
Start: 2025-05-16 | End: 2025-05-16

## 2025-05-16 RX ORDER — SULFAMETHOXAZOLE AND TRIMETHOPRIM 800; 160 MG/1; MG/1
1 TABLET ORAL ONCE
Status: COMPLETED | OUTPATIENT
Start: 2025-05-16 | End: 2025-05-16

## 2025-05-16 RX ORDER — SULFAMETHOXAZOLE AND TRIMETHOPRIM 800; 160 MG/1; MG/1
1 TABLET ORAL 2 TIMES DAILY
Qty: 14 TABLET | Refills: 0 | Status: SHIPPED | OUTPATIENT
Start: 2025-05-16 | End: 2025-05-23

## 2025-05-16 RX ADMIN — IBUPROFEN 600 MG: 600 TABLET ORAL at 05:29

## 2025-05-16 RX ADMIN — SULFAMETHOXAZOLE AND TRIMETHOPRIM 1 TABLET: 800; 160 TABLET ORAL at 06:31

## 2025-05-16 RX ADMIN — CLOSTRIDIUM TETANI TOXOID ANTIGEN (FORMALDEHYDE INACTIVATED), CORYNEBACTERIUM DIPHTHERIAE TOXOID ANTIGEN (FORMALDEHYDE INACTIVATED), BORDETELLA PERTUSSIS TOXOID ANTIGEN (GLUTARALDEHYDE INACTIVATED), BORDETELLA PERTUSSIS FILAMENTOUS HEMAGGLUTININ ANTIGEN (FORMALDEHYDE INACTIVATED), BORDETELLA PERTUSSIS PERTACTIN ANTIGEN, AND BORDETELLA PERTUSSIS FIMBRIAE 2/3 ANTIGEN 0.5 ML: 5; 2; 2.5; 5; 3; 5 INJECTION, SUSPENSION INTRAMUSCULAR at 06:31

## 2025-05-16 ASSESSMENT — ACTIVITIES OF DAILY LIVING (ADL)
ADLS_ACUITY_SCORE: 47
ADLS_ACUITY_SCORE: 46
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
## 2025-05-16 NOTE — DISCHARGE INSTRUCTIONS
Continue keflex  Add bactrim   Warm compresses few times per day may help with swelling    Discharge Instructions  Cellulitis    Cellulitis is an infection of the skin that occurs when bacteria enter the skin.   Symptoms are generally redness, swelling, warmth and pain.  Your infection appeared to be appropriate to treat at home with antibiotics.  However, sometimes your infection may be worse than it seemed at first, or may worsen with time. If you have new or worse symptoms, you may need to be seen again in the Emergency Department or by your primary provider.    Generally, every Emergency Department visit should have a follow-up clinic visit with either a primary or a specialty clinic/provider. Please follow-up as instructed by your emergency provider today.    Return to the Emergency Department if:  The redness, pain, or swelling gets a lot worse.  If the red area was marked, return if it is red significantly beyond the marked area.  You are unable to get your antibiotics, or are vomiting (throwing up) these pills, or you cannot take them.  You are feeling more ill, weak or lightheaded.  You start to run a new fever (temperature >101 F).  Anything else about the infection worries or concerns you.  Treatment:  Start your antibiotics right away, and take them as prescribed. Be sure to finish the whole prescription, even if you are better.  Apply a heating pad, warm packs, or warm water soaks to the infected area for 15 minutes at a time, at least 3 times a day. Do not use a heating pad on your feet or legs if you have diabetes. Do not sleep with a heating pad on, since this can cause burns or skin injury.  Rest your injured area for at least 1-2 days. After that you may start using your extremity again as long as there is not too much pain.   Raise the injured area above the level of your heart as much as possible in the first 1-2 days.  Tylenol  (acetaminophen), Motrin  (ibuprofen), or Advil  (ibuprofen) may help  may help reduce pain and fever and may help you feel more comfortable. Be sure to read and follow the package directions, and ask your provider if you have questions.    If you were given a prescription for medicine here today, be sure to read all of the information (including the package insert) that comes with your prescription.  This will include important information about the medicine, its side effects, and any warnings that you need to know about.  The pharmacist who fills the prescription can provide more information and answer questions you may have about the medicine.  If you have questions or concerns that the pharmacist cannot address, please call or return to the Emergency Department.     Remember that you can always come back to the Emergency Department if you are not able to see your regular provider in the amount of time listed above, if you get any new symptoms, or if there is anything that worries you.

## 2025-05-16 NOTE — ED TRIAGE NOTES
Patient was in the ER recently and diagnosed with cellulitis in the left knee.  Pt has knee marked with skin marker and redness has gotten worse.  Pt has been taking his antibiotics.

## 2025-05-21 LAB
BACTERIA SPEC CULT: NO GROWTH
BACTERIA SPEC CULT: NO GROWTH

## (undated) RX ORDER — FENTANYL CITRATE 50 UG/ML
INJECTION, SOLUTION INTRAMUSCULAR; INTRAVENOUS
Status: DISPENSED
Start: 2017-06-22

## (undated) RX ORDER — HEPARIN SODIUM 1000 [USP'U]/ML
INJECTION, SOLUTION INTRAVENOUS; SUBCUTANEOUS
Status: DISPENSED
Start: 2017-06-22